# Patient Record
Sex: MALE | Race: WHITE | NOT HISPANIC OR LATINO | Employment: FULL TIME | URBAN - METROPOLITAN AREA
[De-identification: names, ages, dates, MRNs, and addresses within clinical notes are randomized per-mention and may not be internally consistent; named-entity substitution may affect disease eponyms.]

---

## 2017-07-17 ENCOUNTER — TELEPHONE (OUTPATIENT)
Dept: NEUROLOGY | Facility: CLINIC | Age: 27
End: 2017-07-17

## 2017-07-17 NOTE — TELEPHONE ENCOUNTER
----- Message from Smith Dukes sent at 7/14/2017  3:42 PM CDT -----  Contact: Pt  X_ 1st Request  _ 2nd Request  _ 3rd Request    Who:HOLLAND COLE [[id number]]    Why: Patient would like to speak with staff in regards to having a sooner appointment with Dr. Ritter. Patient stated he fell off a guerrier board during memorial day weekend and having constant headaches and nausea. Please advise    What Number to Call Back: [phone]    When to Expect a call back: (Before the end of the day)  -- if call after 3:00 call back will be tomorrow.

## 2017-08-09 ENCOUNTER — OFFICE VISIT (OUTPATIENT)
Dept: NEUROLOGY | Facility: CLINIC | Age: 27
End: 2017-08-09
Payer: COMMERCIAL

## 2017-08-09 VITALS
WEIGHT: 142.63 LBS | HEART RATE: 88 BPM | BODY MASS INDEX: 22.39 KG/M2 | HEIGHT: 67 IN | SYSTOLIC BLOOD PRESSURE: 128 MMHG | DIASTOLIC BLOOD PRESSURE: 88 MMHG

## 2017-08-09 DIAGNOSIS — F07.81 POST-CONCUSSION SYNDROME: ICD-10-CM

## 2017-08-09 DIAGNOSIS — S06.0X0A CONCUSSION WITHOUT LOSS OF CONSCIOUSNESS, INITIAL ENCOUNTER: ICD-10-CM

## 2017-08-09 PROCEDURE — 3008F BODY MASS INDEX DOCD: CPT | Mod: S$GLB,,, | Performed by: PSYCHIATRY & NEUROLOGY

## 2017-08-09 PROCEDURE — 99204 OFFICE O/P NEW MOD 45 MIN: CPT | Mod: S$GLB,,, | Performed by: PSYCHIATRY & NEUROLOGY

## 2017-08-09 PROCEDURE — 99999 PR PBB SHADOW E&M-EST. PATIENT-LVL III: CPT | Mod: PBBFAC,,, | Performed by: PSYCHIATRY & NEUROLOGY

## 2017-08-09 RX ORDER — TRAMADOL HYDROCHLORIDE 100 MG/1
100 CAPSULE ORAL
COMMUNITY
End: 2017-09-11

## 2017-08-09 RX ORDER — PANTOPRAZOLE SODIUM 40 MG/1
20 TABLET, DELAYED RELEASE ORAL DAILY
COMMUNITY
End: 2018-05-08 | Stop reason: SDUPTHER

## 2017-08-09 RX ORDER — PROMETHAZINE HYDROCHLORIDE 25 MG/1
25 TABLET ORAL EVERY 4 HOURS
COMMUNITY
End: 2018-03-01

## 2017-08-09 NOTE — ASSESSMENT & PLAN NOTE
Magnesium Oxide for headaches for now until follow up   Have discussed structure of RTP at a slow pace to build up endurance

## 2017-08-09 NOTE — PROGRESS NOTES
"Subjective:       Patient ID: Jae Nassar is a 27 y.o. male.    Chief Complaint:  Concussion      Consultation Requested by:   Aaareferral Self  No address on file    History of Present Illness  27-year-old male presents for evaluation of concussion sustained on 5/28/17.  He notes that he was with his family and fell off of a "hover board".  He did not hit his head however notes that 20 minutes after falling off of it he started having lightheadedness followed by subsequent headache and nausea.  He notes that at the beginning of June he would have a headache at least once a week which became worse with exertion.  He notes by the time late June was occurring he was feeling better and notes that he may have overdone it and played rugby increased video game use increases screen time he notes that on June 30 he had a migraine all day and on July 1 had a migraine all day with nausea.  He had daily headaches until 7/17 which were mild to moderate.  He saw a neurologist on the [facility] who saw him 3 times.  He was given a Medrol Dosepak and that ibuprofen and promethazine.  He was given an MRI which was normal.  Unfortunately it appears that the ibuprofen has caused him the beginnings of gastritis for which he will be evaluated for stomach ulcer in the next few weeks.  He notes that once he took ibuprofen for the 2 weeks his headache was completely gone but he did have stomach ache.  He started having nausea with eating food.  He notes his headaches came back on August 5.  He has had a decreased in his nausea notes that he still has headaches in the afternoon.  Since Saturday he notes that he only has a very mild daily headache which is a 1-2 out of 10 in intensity but has been occurring every single day.  He notes he is back to his usual self in terms of emotions sleep cycle and cognitive response although he has cut back on his physical activity significantly and has lost muscle mass noticeably.  He has filled " out a [facility] postconcussion symptom questionnaire and scored a 2, a mild problem for headaches, noise sensitivity, sleep disturbance, fatigue, feeling depressed, taking her to think, light sensitivity.  He scored a 1, no more of a problem for dizziness and nausea.  He is scored a 0, not experienced at all for being irritable, feeling frustrated, forgetfulness, poor concentration, blurred vision, double vision, restlessness.    Past Medical History:   Diagnosis Date    Headache        Past Surgical History:   Procedure Laterality Date    ORTHOPEDIC SURGERY Left 2010    labrum repair       History reviewed. No pertinent family history.    Social History     Social History    Marital status:      Spouse name: N/A    Number of children: N/A    Years of education: N/A     Social History Main Topics    Smoking status: Never Smoker    Smokeless tobacco: Never Used    Alcohol use None    Drug use: Unknown    Sexual activity: Not Asked     Other Topics Concern    None     Social History Narrative    None       Review of Systems  Review of Systems   Constitutional: Positive for activity change and fatigue.   Eyes: Positive for visual disturbance.   Musculoskeletal: Positive for neck pain and neck stiffness.   Neurological: Positive for dizziness and headaches.   Psychiatric/Behavioral: Positive for decreased concentration and sleep disturbance.       Objective:     Vitals:    08/09/17 1555   BP: 128/88   Pulse: 88      Physical Exam   Constitutional: He is oriented to person, place, and time. He appears well-developed and well-nourished. No distress.   HENT:   Head: Normocephalic and atraumatic.   Right Ear: Hearing normal.   Left Ear: Hearing normal.   Eyes: EOM are normal. Pupils are equal, round, and reactive to light. Right eye exhibits normal extraocular motion and no nystagmus. Left eye exhibits normal extraocular motion and no nystagmus.   Neck: Normal range of motion. Neck supple. No spinous  process tenderness and no muscular tenderness present. Carotid bruit is not present. No neck rigidity.   Musculoskeletal: Normal range of motion.   Neurological: He is alert and oriented to person, place, and time. He has normal strength and normal reflexes. He displays no atrophy, no tremor and normal reflexes. No cranial nerve deficit or sensory deficit. He exhibits normal muscle tone. He displays a negative Romberg sign. Gait normal. Coordination and gait normal. GCS eye subscore is 4. GCS verbal subscore is 5. GCS motor subscore is 6.   Reflex Scores:       Tricep reflexes are 2+ on the right side and 2+ on the left side.       Bicep reflexes are 2+ on the right side and 2+ on the left side.       Brachioradialis reflexes are 2+ on the right side and 2+ on the left side.       Patellar reflexes are 2+ on the right side and 2+ on the left side.       Achilles reflexes are 2+ on the right side and 2+ on the left side.  Fundoscopic exam shows no papilledema, no hemorrhage, no exudates bilaterally.    Cranial Nerves 2-12 are without focal deficit.      Psychiatric: He has a normal mood and affect. His speech is normal and behavior is normal. Thought content normal.       Neurologic Exam     Mental Status   Oriented to person, place, and time.   Attention: normal. Concentration: normal.   Speech: speech is normal   Level of consciousness: alert  Knowledge: good.   Normal comprehension.     Cranial Nerves   Cranial nerves II through XII intact.     CN II   Visual fields full to confrontation.     CN III, IV, VI   Pupils are equal, round, and reactive to light.  Extraocular motions are normal.     CN V   Facial sensation intact.     CN VII   Facial expression full, symmetric.     CN VIII   CN VIII normal.     CN IX, X   CN IX normal.   CN X normal.     CN XI   CN XI normal.     CN XII   CN XII normal.     Motor Exam   Muscle bulk: normal  Overall muscle tone: normal    Strength   Strength 5/5 throughout.     Sensory  Exam   Light touch normal.   Pinprick normal.     Gait, Coordination, and Reflexes     Gait  Gait: normal    Reflexes   Right brachioradialis: 2+  Left brachioradialis: 2+  Right biceps: 2+  Left biceps: 2+  Right triceps: 2+  Left triceps: 2+  Right patellar: 2+  Left patellar: 2+  Right achilles: 2+  Left achilles: 2+    I spent over 50% of a 45 minute visit in guidance, counseling and discussion of treatment options.  Assessment/Plan:     Problem List Items Addressed This Visit        Neuro    Concussion without loss of consciousness    Overview     From fall off of a hoverboard         Post-concussion syndrome    Overview     Lingering headaches mild dizziness  HA 1-2/10 now, but occur daily  Notes he feels 80% of normal  Wants to return to physical activity         Current Assessment & Plan     Magnesium Oxide for headaches for now until follow up   Have discussed structure of RTP at a slow pace to build up endurance           Other Visit Diagnoses    None.          27-year-old male presents for evaluation of concussion sustained on 5/28/17 without direct head impact.  At this time the patient has had a significant recovery although is not 100%.  For now we have discussed that due to the mild nature of his headaches though they're lingering I would consider giving him supplements for headache including magnesium oxide versus riboflavin.  We have discussed extensively a return to play program that may include a progressive four-week physical activity regimen with activity 2-3 times a week.  I will see him back in about 4-6 weeks' time to evaluate how he is doing.  At that time if he has not had much improvement than I would consider intervention with physical therapy and/or pharmaceutical agents.  We have discussed the fact that I do not believe that his previous 2 concussions are contributing to his current concussion although he may have had some prolonged symptoms due to a return to a level of activity that  was too high for what he could tolerate.  We have discussed that I do not believe that his head injuries will lead him to chronic traumatic encephalopathy.   The patient verbalizes understanding and agreement with the treatment plan. Questions were sought and answered to his stated verbal satisfaction.        Alley Dhaliwal MD    This note is dictated on Dragon Natural Speaking word recognition program. There are word recognition mistakes that are occasionally missed on review.

## 2017-08-10 ENCOUNTER — PATIENT MESSAGE (OUTPATIENT)
Dept: NEUROLOGY | Facility: CLINIC | Age: 27
End: 2017-08-10

## 2017-08-10 ENCOUNTER — TELEPHONE (OUTPATIENT)
Dept: NEUROLOGY | Facility: CLINIC | Age: 27
End: 2017-08-10

## 2017-08-10 ENCOUNTER — OFFICE VISIT (OUTPATIENT)
Dept: NEUROLOGY | Facility: CLINIC | Age: 27
End: 2017-08-10
Payer: COMMERCIAL

## 2017-08-10 VITALS
SYSTOLIC BLOOD PRESSURE: 122 MMHG | WEIGHT: 140 LBS | HEIGHT: 67 IN | DIASTOLIC BLOOD PRESSURE: 90 MMHG | HEART RATE: 88 BPM | BODY MASS INDEX: 21.97 KG/M2

## 2017-08-10 DIAGNOSIS — F07.81 POST-CONCUSSION SYNDROME: ICD-10-CM

## 2017-08-10 DIAGNOSIS — S13.4XXA WHIPLASH, INITIAL ENCOUNTER: Primary | ICD-10-CM

## 2017-08-10 DIAGNOSIS — S19.9XXA NECK INJURY, INITIAL ENCOUNTER: Primary | ICD-10-CM

## 2017-08-10 PROCEDURE — 99214 OFFICE O/P EST MOD 30 MIN: CPT | Mod: S$GLB,,, | Performed by: PSYCHIATRY & NEUROLOGY

## 2017-08-10 PROCEDURE — 99999 PR PBB SHADOW E&M-EST. PATIENT-LVL III: CPT | Mod: PBBFAC,,, | Performed by: PSYCHIATRY & NEUROLOGY

## 2017-08-10 PROCEDURE — 3008F BODY MASS INDEX DOCD: CPT | Mod: S$GLB,,, | Performed by: PSYCHIATRY & NEUROLOGY

## 2017-08-10 NOTE — PROGRESS NOTES
Subjective:       Patient ID: Jae Nassar is a 27 y.o. male.    Reason for Consult: Neck Pain      Interval History:  Jae Nassar is here for follow up. Their condition has changed.  Apparently his wife was trying to pick his nose last night and he abruptly jerked his head.  He notes that when he did this he became somewhat nauseous and very anxious about injuring his neck and repeating his concussion.  He overall does complain about anxiousness and is anxious on today's visit.  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     Objective:     Vitals:    08/10/17 1142   BP: (!) 122/90   Pulse: 88     There is tenderness to palpation left greater than right splenius capitis trapezius and cervical paraspinal musculature with positive muscle twitch response otherwise strength is antigravity in all 4 limbs of cranial nerves II through XII without focal deficit.  Focused examination was undertaken today. Over 50% of face to face time of 25 minute visit time was in giving guidance, counseling and discussing treatment options.    Assessment/Plan:     Problem List Items Addressed This Visit        Neuro    Post-concussion syndrome    Overview     Lingering headaches mild dizziness  HA 1-2/10 now, but occur daily  Notes he feels 80% of normal  Wants to return to physical activity  Now with neck stiffness  Question of stomach ulcer from 2 weeks of ibuprofen         Whiplash - Primary    Overview     Turned head away from wife when she was trying to pick his nose on 8/9/17  Notes that this worsened his anxiety and nausea  Notes he had a mild worsening of headache         Relevant Orders    Ambulatory Referral to Physical/Occupational Therapy      Other Visit Diagnoses    None.       27-year-old male presents for evaluation of an acute whiplash injury related to interaction with his wife last night.  This is all in the  setting of recovery after concussion that has been prolonged.  At this time we have discussed treatment options and I would recommend cervical spine rehabilitation.  He is having his upper endoscopy next week.  I do believe that some of his symptoms may actually be contributing to a possible ulcer or gastritis which he is having further workup for.  He has asked for medication for anxiety however I've explained to him that should try working on mindfulness and breathing techniques in the time being.  Consider referral to psychiatry if anxiety continues to be a lingering symptom.  I will follow up with them in 2 month(s).  The patient verbalizes understanding and agreement with the treatment plan. I have discussed risks, benefits and alternatives to the treatment plan. Questions were sought and answered to his stated verbal satisfaction.        Alley Dhaliwal MD    This note is dictated on Dragon Natural Speaking word recognition program. There are word recognition mistakes that are occasionally missed on review.

## 2017-08-10 NOTE — TELEPHONE ENCOUNTER
"----- Message from Sheila Cervantes sent at 8/10/2017  7:45 AM CDT -----  Contact: Patient himself  X 1st Request  _  2nd Request  _  3rd Request    Who: Jae Nassar (mrn# [id number])    Why:  Patient called and said, "he twisted his neck last night and couldn't sleep."  Also said, "he was dizzy and had up set stomach."  Please give a call back at your earliest convenience.   THANKS!    What Number to Call Back: [phone]    When to Expect a call back: (With in 24 hours)                      "

## 2017-08-10 NOTE — TELEPHONE ENCOUNTER
I have responded to the patient mychart message.     If he still has questions after having read my response, he is welcome to come back in for evaluation, though based on his message, I recommended monitoring of his clinical condition.

## 2017-08-11 ENCOUNTER — PATIENT MESSAGE (OUTPATIENT)
Dept: NEUROLOGY | Facility: CLINIC | Age: 27
End: 2017-08-11

## 2017-08-16 ENCOUNTER — CLINICAL SUPPORT (OUTPATIENT)
Dept: REHABILITATION | Facility: HOSPITAL | Age: 27
End: 2017-08-16
Attending: PSYCHIATRY & NEUROLOGY
Payer: COMMERCIAL

## 2017-08-16 DIAGNOSIS — M99.01 SEGMENTAL AND SOMATIC DYSFUNCTION OF CERVICAL REGION: ICD-10-CM

## 2017-08-16 DIAGNOSIS — S13.4XXD WHIPLASH INJURY, SUBSEQUENT ENCOUNTER: Primary | ICD-10-CM

## 2017-08-16 PROCEDURE — 97161 PT EVAL LOW COMPLEX 20 MIN: CPT | Mod: PN | Performed by: PHYSICAL THERAPIST

## 2017-08-16 NOTE — PROGRESS NOTES
Physical Therapy Initial Evaluation     Name: Jae Nassar  [facility] Number: [id number]    Diagnosis:   Encounter Diagnoses   Name Primary?    Whiplash injury, subsequent encounter Yes    Segmental and somatic dysfunction of cervical region      Physician: Clive Dhaliwal III, MD  Treatment Orders: PT Eval and Treat  Past Medical History:   Diagnosis Date    Headache      Current Outpatient Prescriptions   Medication Sig    pantoprazole (PROTONIX) 40 MG tablet Take 40 mg by mouth once daily.    promethazine (PHENERGAN) 25 MG tablet Take 25 mg by mouth every 4 (four) hours.    tramadol 100 mg MP25 Take 100 mg by mouth.     No current facility-administered medications for this visit.      Review of patient's allergies indicates:   Allergen Reactions    Pcn [penicillins]      Start Time:  1615  Stop Time:  1715  Total Timed Minutes:  60          Subjective       Patient states:  Headaches with bilateral tightness that feels at the base of the head and neck. The HA are intermittent. Says they are all over and no one location that he can describe.   Onset: late May when he fell off a hover board landing on his side. He says that he did not strike his head and did not loose consciousness. Developed some nausea and light headedness that evening. This resolved in a few hours. HA and light headedness ensued through the month of June. Began with migraines in late June / early July. Jerked his neck one week ago with return of nausea and light headedness. This resolved in 24 hours.  Presently he says he is improved. Just has HA that occur in the latter part of the day.   Radicular symptoms:  No   Aggravating factors:   Quick sudden movements that involve looking up or down and turning.   Easing factors:  Self limits.   Sleep - no disturbance, taking melatonin   Dizziness, blurred vision, tinnitus - negative   Headaches - yes  Nausea - no   Difficulty swallowing - no     Pain Scale: Patient rates pain on a  scale of 0-10 to be  0 cervical pain   currently   1-2 tightness at worst ;   0 at best .    Prior Therapy: no   Home Environment (Steps/Adaptations): hi location of TV in bedroom ( hi and to the right)  Functional Deficits Leading to Referral:   Personal - no limitations    Domestic - no limitations   Community / Social - no limitations   Occupation - no limitations   Prior functional status: no restrictionns    Occupation:  Teacher of special needs students.                     Pts goals:  Try to eliminate the headaches.   Past History: Patient thinks he may have had two concussions in high school while playing Rugby but did not see MD or was diagnosed. Left shoulder surgery, labrum repair seven years ago by Dr. Gallego. Denies other musculoskeletal conditions for which he was evaluated.   Imaging: MRI - brain.    Objective     Posture Alignment: lateral deviation / tilt  to the left   Palpation: pain at right sub-occipital and over right articular pillar at C1C2, no areas of spasm or guarding, mild tightness right side at the level of C2C3 articular pillar.   Sensation: Light Touch: Intact    Range of Motion/Strength:     Cervical/Thoracic AROM: Pain/Dysfunction with Movement:   Flexion                                      80 FN   Extension                                  70 DN - tight sub-occipital area - end range   Right side bending                     65 FN   Left side bending                        60 FN - tight on the right    Right rotation FN    Left rotation DP - tight at end range     SEGMENTAL MOBILITY: normal segmental mobility in the cervical spine;.     UPPER EXTREMITY STRENGTH:   Left 5/5 Right 5/5         DTR's:   Left Right   Biceps Tendon 2+ 2+   Brachioradialis 2+ 2+   Triceps Tendon 1+ 1+       Special Tests:   Left Right   Compression neg neg   Dystraction neg neg   Spurling neg neg       Selective Functional Movement Assessment:  FN: functional, non-painful  FP: functional, painful  DP:  dysfunctional, painful  DN: dysfunctional, non-painful    Active Cervical Flexion: see above   Active Cervical Extension: see above   Cervical Rotation:  Right:see above   Left: see abpove  Upper extremity pattern 1:  Right: FN  Left:FN  Upper extremity pattern 2:  Right:FN  Left:FN  Breakouts  OA active - DN to the left with restriction on the right .      PT Evaluation Completed? Yes  Discussed Plan of Care with patient: Yes      Pt/family was provided educational information, including: role of PT, goals for PT, scheduling - pt verbalized understanding.       Assessment       Initial Assessment (Pertinent finding, problem list and factors affecting outcome): The patient is referred with c/o headaches. Clinical findings demonstrate a joint mobility dysfunction with some tissue extensibility dysfunction in the right sub-occipital region and upper cervical spine. There are no neurological deficits identified.   Pt presents with signs and symptoms consistent with referring diagnosis. Evaluation has determined  subjective and objective deficits that can be addressed by physical therapy intervention. Pt demonstrates headaches limiting functional activities. Decreased flexibility  limiting normal movement patterns. Decreased segmental motion noted. Decreased postural strength and awareness.  Decreased participation in functional and recreational activities on occasion . Subjective and objective measures are addressed by goals in the plan of care. Patient/family are involved in the development of these goals. Jae Nassar should benefit from therapeutic intervention to treat the symptoms and achieve established goals.Realistic expectations and hopeful outcomes were discussed. The patient demonstrated and verbalized an understanding of the program and goals as well as expectations. Patient has no barriers to learning. Patient verbalizes understanding of her program and goals. No cultural, spiritual, or  educational needs identified.  Rehab Potiential: good     Medical necessity is demonstrated by the following IMPAIRMENTS/PROBLEMS:  headaches and diminished segmental function as well as decreased flexibility and tissue extensibility        History  Co-morbidities and personal factors that may impact the plan of care Examination  Body Structures and Functions, activity limitations and participation restrictions that may impact the plan of care Clinical Presentation   Decision Making/ Complexity Score   Co-morbidities:  none.              Personal Factors:   none Body Regions: neckl    Body Systems: musculoskeletal          Activity limitations: Quick sudden movements that involve looking up or down and turning.       Participation Restrictions:   Does not identify any at this time.         Stable and uncomplicated              Complexity:  Low               Short Term GOALS: 6 weeks. Pt agrees with goals set.  1. Decrease headaches and pain frequency and intensity 20-40%  2. Increase flexibility in the cervical and scapulothoracic musculature  3. Sleeping without interrupted when melatonin is stopped.   4. Patient demonstrates independence with HEP.   5. Patient demonstrates independence with Postural Awareness and correction   6. Restore full cervical mobility in left rotation cervical OA motion     Long Term GOALS: 12 weeks. Pt agrees with goals set.  1. Patient demonstrates increased scapulothoracic strength and endurance to improve tolerance to functional activities.   2. Patient demonstrates increased function without limitation from pain and headaches upon sudden movements of the neck   .     CMS Impairment/Limitation/Restriction for FOTO Neck Survey  Status Limitation G-Code CMS Severity Modifier  Intake 67% 33% Current Status CJ - At least 20 percent but less than 40 percent  Predicted 81% 19% Goal Status+ CI - At least 1 percent but less than 20 percent  D/C Status CJ **only report if this is a one time  visit    Plan       Certification Period: 8/16/2017 to 9/16/2017  Recommended Treatment Plan: 2 times per week for 12 weeks: Manual Therapy, Patient Education and Therapeutic Exercise  Other Recommendations:    Pt may be seen by PTA as part of the rehabilitation team.         Therapist: Faraz Rodriguez PT

## 2017-08-17 NOTE — PLAN OF CARE
Physical Therapy Initial Evaluation     Name: Jae Nassar  [facility] Number: [id number]    Diagnosis:   Encounter Diagnoses   Name Primary?    Whiplash injury, subsequent encounter Yes    Segmental and somatic dysfunction of cervical region      Physician: Clive Dhaliwal III, MD  Treatment Orders: PT Eval and Treat  Past Medical History:   Diagnosis Date    Headache      Current Outpatient Prescriptions   Medication Sig    pantoprazole (PROTONIX) 40 MG tablet Take 40 mg by mouth once daily.    promethazine (PHENERGAN) 25 MG tablet Take 25 mg by mouth every 4 (four) hours.    tramadol 100 mg MP25 Take 100 mg by mouth.     No current facility-administered medications for this visit.      Review of patient's allergies indicates:   Allergen Reactions    Pcn [penicillins]      Start Time:  1615  Stop Time:  1715  Total Timed Minutes:  60          Subjective       Patient states:  Headaches with bilateral tightness that feels at the base of the head and neck. The HA are intermittent. Says they are all over and no one location that he can describe.   Onset: late May when he fell off a hover board landing on his side. He says that he did not strike his head and did not loose consciousness. Developed some nausea and light headedness that evening. This resolved in a few hours. HA and light headedness ensued through the month of June. Began with migraines in late June / early July. Jerked his neck one week ago with return of nausea and light headedness. This resolved in 24 hours.  Presently he says he is improved. Just has HA that occur in the latter part of the day.   Radicular symptoms:  No   Aggravating factors:   Quick sudden movements that involve looking up or down and turning.   Easing factors:  Self limits.   Sleep - no disturbance, taking melatonin   Dizziness, blurred vision, tinnitus - negative   Headaches - yes  Nausea - no   Difficulty swallowing - no     Pain Scale: Patient rates pain on a scale of  0-10 to be  0 cervical pain   currently   1-2 tightness at worst ;   0 at best .    Prior Therapy: no   Home Environment (Steps/Adaptations): hi location of TV in bedroom ( hi and to the right)  Functional Deficits Leading to Referral:   Personal - no limitations    Domestic - no limitations   Community / Social - no limitations   Occupation - no limitations   Prior functional status: no restrictionns    Occupation:  Teacher of special needs students.                     Pts goals:  Try to eliminate the headaches.   Past History: Patient thinks he may have had two concussions in high school while playing Rugby but did not see MD or was diagnosed. Left shoulder surgery, labrum repair seven years ago by Dr. Gallego. Denies other musculoskeletal conditions for which he was evaluated.   Imaging: MRI - brain.    Objective     Posture Alignment: lateral deviation / tilt  to the left   Palpation: pain at right sub-occipital and over right articular pillar at C1C2, no areas of spasm or guarding, mild tightness right side at the level of C2C3 articular pillar.   Sensation: Light Touch: Intact    Range of Motion/Strength:     Cervical/Thoracic AROM: Pain/Dysfunction with Movement:   Flexion                                      80 FN   Extension                                  70 DN - tight sub-occipital area - end range   Right side bending                     65 FN   Left side bending                        60 FN - tight on the right    Right rotation FN    Left rotation DP - tight at end range     SEGMENTAL MOBILITY: normal segmental mobility in the cervical spine;.     UPPER EXTREMITY STRENGTH:   Left 5/5 Right 5/5         DTR's:   Left Right   Biceps Tendon 2+ 2+   Brachioradialis 2+ 2+   Triceps Tendon 1+ 1+       Special Tests:   Left Right   Compression neg neg   Dystraction neg neg   Spurling neg neg       Selective Functional Movement Assessment:  FN: functional, non-painful  FP: functional, painful  DP:  dysfunctional, painful  DN: dysfunctional, non-painful    Active Cervical Flexion: see above   Active Cervical Extension: see above   Cervical Rotation:  Right:see above   Left: see abpove  Upper extremity pattern 1:  Right: FN  Left:FN  Upper extremity pattern 2:  Right:FN  Left:FN  Breakouts  OA active - DN to the left with restriction on the right .      PT Evaluation Completed? Yes  Discussed Plan of Care with patient: Yes      Pt/family was provided educational information, including: role of PT, goals for PT, scheduling - pt verbalized understanding.       Assessment       Initial Assessment (Pertinent finding, problem list and factors affecting outcome): The patient is referred with c/o headaches. Clinical findings demonstrate a joint mobility dysfunction with some tissue extensibility dysfunction in the right sub-occipital region and upper cervical spine. There are no neurological deficits identified.   Pt presents with signs and symptoms consistent with referring diagnosis. Evaluation has determined  subjective and objective deficits that can be addressed by physical therapy intervention. Pt demonstrates headaches limiting functional activities. Decreased flexibility  limiting normal movement patterns. Decreased segmental motion noted. Decreased postural strength and awareness.  Decreased participation in functional and recreational activities on occasion . Subjective and objective measures are addressed by goals in the plan of care. Patient/family are involved in the development of these goals. Jae Nassar should benefit from therapeutic intervention to treat the symptoms and achieve established goals.Realistic expectations and hopeful outcomes were discussed. The patient demonstrated and verbalized an understanding of the program and goals as well as expectations. Patient has no barriers to learning. Patient verbalizes understanding of her program and goals. No cultural, spiritual, or  educational needs identified.  Rehab Potiential: good     Medical necessity is demonstrated by the following IMPAIRMENTS/PROBLEMS:  headaches and diminished segmental function as well as decreased flexibility and tissue extensibility        History  Co-morbidities and personal factors that may impact the plan of care Examination  Body Structures and Functions, activity limitations and participation restrictions that may impact the plan of care Clinical Presentation   Decision Making/ Complexity Score   Co-morbidities:  none.              Personal Factors:   none Body Regions: neckl    Body Systems: musculoskeletal          Activity limitations: Quick sudden movements that involve looking up or down and turning.       Participation Restrictions:   Does not identify any at this time.         Stable and uncomplicated              Complexity:  Low               Short Term GOALS: 6 weeks. Pt agrees with goals set.  1. Decrease headaches and pain frequency and intensity 20-40%  2. Increase flexibility in the cervical and scapulothoracic musculature  3. Sleeping without interrupted when melatonin is stopped.   4. Patient demonstrates independence with HEP.   5. Patient demonstrates independence with Postural Awareness and correction   6. Restore full cervical mobility in left rotation cervical OA motion     Long Term GOALS: 12 weeks. Pt agrees with goals set.  1. Patient demonstrates increased scapulothoracic strength and endurance to improve tolerance to functional activities.   2. Patient demonstrates increased function without limitation from pain and headaches upon sudden movements of the neck   .     CMS Impairment/Limitation/Restriction for FOTO Neck Survey  Status Limitation G-Code CMS Severity Modifier  Intake 67% 33% Current Status CJ - At least 20 percent but less than 40 percent  Predicted 81% 19% Goal Status+ CI - At least 1 percent but less than 20 percent  D/C Status CJ **only report if this is a one time  visit    Plan       Certification Period: 8/16/2017 to 9/16/2017  Recommended Treatment Plan: 2 times per week for 12 weeks: Manual Therapy, Patient Education and Therapeutic Exercise  Other Recommendations:    Pt may be seen by PTA as part of the rehabilitation team.         Therapist: Faraz Rodriguez PT

## 2017-08-21 ENCOUNTER — CLINICAL SUPPORT (OUTPATIENT)
Dept: REHABILITATION | Facility: HOSPITAL | Age: 27
End: 2017-08-21
Attending: PSYCHIATRY & NEUROLOGY
Payer: COMMERCIAL

## 2017-08-21 DIAGNOSIS — S13.4XXD WHIPLASH INJURY, SUBSEQUENT ENCOUNTER: Primary | ICD-10-CM

## 2017-08-21 DIAGNOSIS — M99.01 SEGMENTAL AND SOMATIC DYSFUNCTION OF CERVICAL REGION: ICD-10-CM

## 2017-08-21 PROCEDURE — 97110 THERAPEUTIC EXERCISES: CPT | Mod: PN | Performed by: PHYSICAL THERAPIST

## 2017-08-21 NOTE — PROGRESS NOTES
"                                            Physical Therapy Daily Note           Name: Jae Nassar  Clinic Number: [id number]  Date of Treatment: 08/21/2017   Diagnosis:   Encounter Diagnoses   Name Primary?    Whiplash injury, subsequent encounter Yes    Segmental and somatic dysfunction of cervical region        Time in: 1510  Time Out: 1600  Total Treatment Time: 50    VISITS / HOLDEN: 2/  BOLD=INDICATES ACTIVITIES PERFORMED.      Subjective  Patient states "he has not having HA today or yesterday. No neck pain the past two days. Sleeping fine w/o interruption. Using one pillow.   Pain level - 0/10    Objective    Treatment: Patient  was instructed in and performed therapeutic exercises to develop strength, ROM, flexibility and posture. Patient performed therapeutic exercises consisting of the following      Leg press   Recumbent bike  Upright bike   UE ergometer 6'   Treadmill   Elliptical       THERAPEUTIC EXERCISE  SUPINE - semi recumbent   -chin tucks   -active rotation    -OA nodding  -am raise w/rotation   -arm pit sniff stretch   -cerv tess   -thoracic stretch 1' x 3    SIDELYING    PRONE  -cerv rotation  x15      STANDING.    SITTING         MANUAL THERAPY  MODALITY  DIRECT EDUCATION:       Assessment    Did not demonstrate limitation in left rotation. There is tightness throughout the thoracic spine segments. Completed activities w/o pain or recurrence of HA. Progress with ST strengthening.   Medical Necessity is demonstrated by:   difficulty  to participate in daily activities  Pain limits function of effected part for some activities, Requires skilled supervision to complete and progress treatment interventions and HEP.  Pt demo good understanding of the education provided. Patient demonstrated good return demonstration of activities.Patient's tolerance to treatment was good. Patient will continue to benefit from skilled PTintervention.Patient is making progress towards established " goals.  New/Revised goals: no  Continue with established Plan of Care towards PT goals.       PLAN     Certification Period: 8/16/2017 to 9/16/2017  Recommended Treatment Plan: 2 times per week for 12 weeks: Manual Therapy, Patient Education and Therapeutic Exercise  Other Recommendations:    Pt may be seen by PTA as part of the rehabilitation team.         Therapist: Faraz Rodriguez PT

## 2017-08-23 ENCOUNTER — CLINICAL SUPPORT (OUTPATIENT)
Dept: REHABILITATION | Facility: HOSPITAL | Age: 27
End: 2017-08-23
Attending: PSYCHIATRY & NEUROLOGY
Payer: COMMERCIAL

## 2017-08-23 DIAGNOSIS — M99.01 SEGMENTAL AND SOMATIC DYSFUNCTION OF CERVICAL REGION: ICD-10-CM

## 2017-08-23 DIAGNOSIS — S13.4XXD WHIPLASH INJURY, SUBSEQUENT ENCOUNTER: Primary | ICD-10-CM

## 2017-08-23 PROCEDURE — 97110 THERAPEUTIC EXERCISES: CPT | Mod: PN | Performed by: PHYSICAL THERAPIST

## 2017-08-23 NOTE — PROGRESS NOTES
"                                            Physical Therapy Daily Note           Name: Jae Nassar  Clinic Number: [id number]  Date of Treatment: 08/23/2017   Diagnosis:   Encounter Diagnoses   Name Primary?    Whiplash injury, subsequent encounter Yes    Segmental and somatic dysfunction of cervical region        Time in: 1610  Time Out: 1705  Total Treatment Time: 55    VISITS / HOLDEN: 3/25 - 12/31/2017  BOLD=INDICATES ACTIVITIES PERFORMED.      Subjective  Patient states "he is not having any pain in the neck or shoulder areas   Pain level - 0/10    Objective    Treatment: Patient  was instructed in and performed therapeutic exercises to develop strength, ROM, flexibility and posture. Patient performed therapeutic exercises consisting of the following      Leg press   Recumbent bike  Upright bike   UE ergometer 6'   Treadmill   Elliptical       THERAPEUTIC EXERCISE  SUPINE - semi recumbent   -chin tucks   -active rotation    -OA nodding  -am raise w/rotation   -arm pit sniff stretch   -cerv tess   -thoracic stretch 1' x 3  -patterns TD, [place], [place] 10 patterns    SIDELYING    PRONE  -cerv rotation  x15  -Y,W,T,I  10 patterns     STANDING.    SITTING         MANUAL THERAPY  MODALITY  DIRECT EDUCATION:       Assessment  Completed all activities as noted w/o limitation from pain. He seems to be progressing well at this time.   Medical Necessity is demonstrated by:   difficulty  to participate in daily activities  Pain limits function of effected part for some activities, Requires skilled supervision to complete and progress treatment interventions and HEP.  Pt demo good understanding of the education provided. Patient demonstrated good return demonstration of activities.Patient's tolerance to treatment was good. Patient will continue to benefit from skilled PTintervention.Patient is making progress towards established goals.  New/Revised goals: no  Continue with established Plan of Care towards PT " goals.       PLAN     Certification Period: 8/16/2017 to 9/16/2017  Recommended Treatment Plan: 2 times per week for 12 weeks: Manual Therapy, Patient Education and Therapeutic Exercise  Other Recommendations:    Pt may be seen by PTA as part of the rehabilitation team.         Therapist: Faraz Rodriguez PT

## 2017-08-29 ENCOUNTER — CLINICAL SUPPORT (OUTPATIENT)
Dept: REHABILITATION | Facility: HOSPITAL | Age: 27
End: 2017-08-29
Attending: PSYCHIATRY & NEUROLOGY
Payer: COMMERCIAL

## 2017-08-29 DIAGNOSIS — M99.01 SEGMENTAL AND SOMATIC DYSFUNCTION OF CERVICAL REGION: ICD-10-CM

## 2017-08-29 DIAGNOSIS — S13.4XXD WHIPLASH INJURY, SUBSEQUENT ENCOUNTER: Primary | ICD-10-CM

## 2017-08-29 PROCEDURE — 97110 THERAPEUTIC EXERCISES: CPT | Mod: PN | Performed by: PHYSICAL THERAPIST

## 2017-08-29 NOTE — PROGRESS NOTES
"                                              Physical Therapy Daily Note           Name: Jae Nassar  Clinic Number: [id number]  Date of Treatment: 08/29/2017   Diagnosis:   Encounter Diagnoses   Name Primary?    Whiplash injury, subsequent encounter Yes    Segmental and somatic dysfunction of cervical region        Time in: 1505  Time Out: 1605  Total Treatment Time:     VISITS / HOLDEN: 4/25 - 12/31/2017  BOLD=INDICATES ACTIVITIES PERFORMED.      Subjective  Patient states "that his neck is feeling OK. Says that he was at a function and was turning his head quickly at which point he felt some tugging in the neck that did not last. Slowed down how fast I was turning my head.     Pain level - 0/10    Objective    Treatment: Patient  was instructed in and performed therapeutic exercises to develop strength, ROM, flexibility and posture. Patient performed therapeutic exercises consisting of the following      Leg press   Recumbent bike  Upright bike   UE ergometer 6'   Treadmill   Elliptical       THERAPEUTIC EXERCISE  SUPINE - semi recumbent   -chin tucks  x20   -active rotation  x12  -OA nodding x15   -am raise w/rotation x15  -arm pit sniff stretch 10" x 6   -cerv tess 6" x 10   -thoracic stretch 1' x 3  -patterns TD, [place], [place] 10 patterns 5 w/o cord  - 5 w/cord YTB    SIDELYING    PRONE  -cerv rotation  x15  -Y,W,T,I  10 patterns     STANDING.    SITTING         MANUAL THERAPY  MODALITY  DIRECT EDUCATION:    Patient was issued HEP   Written Home Exercises Reviewed.   Pt demo good understanding of the education provided. Patient demonstrated good return demonstration of activities           Assessment   Completed all activities as noted. He did not demonstrate signs of pain. He was not challenged with performing the activities. Good response to added cord with pattern exercises. Progressing favorably.   Medical Necessity is demonstrated by:   difficulty  to participate in daily activities  Pain " limits function of effected part for some activities, Requires skilled supervision to complete and progress treatment interventions and HEP.  Pt demo good understanding of the education provided. Patient demonstrated good return demonstration of activities.Patient's tolerance to treatment was good. Patient will continue to benefit from skilled PTintervention.Patient is making progress towards established goals.  New/Revised goals: no  Continue with established Plan of Care towards PT goals.       PLAN     Certification Period: 8/16/2017 to 9/16/2017  Recommended Treatment Plan: 2 times per week for 12 weeks: Manual Therapy, Patient Education and Therapeutic Exercise  Other Recommendations:    Pt may be seen by PTA as part of the rehabilitation team.         Therapist: Faraz Rodriguez, PT

## 2017-08-31 ENCOUNTER — CLINICAL SUPPORT (OUTPATIENT)
Dept: REHABILITATION | Facility: HOSPITAL | Age: 27
End: 2017-08-31
Attending: PSYCHIATRY & NEUROLOGY
Payer: COMMERCIAL

## 2017-08-31 DIAGNOSIS — M99.01 SEGMENTAL AND SOMATIC DYSFUNCTION OF CERVICAL REGION: ICD-10-CM

## 2017-08-31 DIAGNOSIS — S13.4XXD WHIPLASH INJURY, SUBSEQUENT ENCOUNTER: Primary | ICD-10-CM

## 2017-08-31 PROCEDURE — 97110 THERAPEUTIC EXERCISES: CPT | Mod: PN

## 2017-08-31 NOTE — PROGRESS NOTES
"                                              Physical Therapy Daily Note           Name: Jae Nassar  Clinic Number: [id number]  Date of Treatment: 08/31/2017   Diagnosis:   Encounter Diagnoses   Name Primary?    Whiplash injury, subsequent encounter Yes    Segmental and somatic dysfunction of cervical region        Time in: 1635  Time Out:   Total Treatment Time:     VISITS / HOLDEN: 5/25 - 12/31/2017  BOLD=INDICATES ACTIVITIES PERFORMED.      Subjective  Patient reports that he does not have any neck pain today, however he has the start of a headache which he feels is from his ulcer.   Pain level - 0/10    Objective    Treatment: Patient  was instructed in and performed therapeutic exercises to develop strength, ROM, flexibility and posture. Patient performed therapeutic exercises consisting of the following      Leg press   Recumbent bike  Upright bike   UE ergometer 10'   Treadmill   Elliptical       THERAPEUTIC EXERCISE  SUPINE - semi recumbent   -chin tucks  x20   -active rotation  x12  -OA nodding x15   -am raise w/rotation x15  -arm pit sniff stretch 10" x 6   -cerv tess 6" x 10   -thoracic stretch 1' x 3  -patterns TD, [place], [place] 10 patterns 5 w/o cord  - 5 w/cord YTB    SIDELYING    PRONE  -cerv rotation  x15  -Y,W,T,I  10 patterns     STANDING    SITTING         MANUAL THERAPY  MODALITY: MHP x 10'   DIRECT EDUCATION:    Patient was issued HEP   Written Home Exercises Reviewed.   Pt demo good understanding of the education provided. Patient demonstrated good return demonstration of activities           Assessment   Jae tolerated treatment session very well.  Pt able to perform all exercises without provocation of pain.  Plan to progress patient with deep flexor strengthening exercises as tolerated by the patient.   Medical Necessity is demonstrated by:   difficulty  to participate in daily activities  Pain limits function of effected part for some activities, Requires skilled supervision " to complete and progress treatment interventions and HEP.  Pt demo good understanding of the education provided. Patient demonstrated good return demonstration of activities.Patient's tolerance to treatment was good. Patient will continue to benefit from skilled PTintervention.Patient is making progress towards established goals.  New/Revised goals: no  Continue with established Plan of Care towards PT goals.       PLAN     Certification Period: 8/16/2017 to 9/16/2017  Recommended Treatment Plan: 2 times per week for 12 weeks: Manual Therapy, Patient Education and Therapeutic Exercise  Other Recommendations:    Pt may be seen by PTA as part of the rehabilitation team.         Therapist: Tonia Vazquez PTA

## 2017-09-05 ENCOUNTER — CLINICAL SUPPORT (OUTPATIENT)
Dept: REHABILITATION | Facility: HOSPITAL | Age: 27
End: 2017-09-05
Attending: PSYCHIATRY & NEUROLOGY
Payer: COMMERCIAL

## 2017-09-05 DIAGNOSIS — M99.01 SEGMENTAL AND SOMATIC DYSFUNCTION OF CERVICAL REGION: ICD-10-CM

## 2017-09-05 DIAGNOSIS — S13.4XXD WHIPLASH INJURY, SUBSEQUENT ENCOUNTER: Primary | ICD-10-CM

## 2017-09-05 PROCEDURE — 97110 THERAPEUTIC EXERCISES: CPT | Mod: PN | Performed by: PHYSICAL THERAPIST

## 2017-09-05 NOTE — PROGRESS NOTES
"                                              Physical Therapy Daily Note           Name: Jae Nassar  Clinic Number: [id number]  Date of Treatment: 09/05/2017   Diagnosis:   Encounter Diagnoses   Name Primary?    Whiplash injury, subsequent encounter Yes    Segmental and somatic dysfunction of cervical region        Time in: 1515  Time Out: 1615  Total Treatment Time: 60    VISITS / HOLDEN: 6/25 - 12/31/2017  BOLD=INDICATES ACTIVITIES PERFORMED.      Subjective  Patient states "that he is not having any pain in the neck and no HAs. Says he had HA over the weekend and his ulcer was acting up some over the weekend. HA may be associated with the ulcer.     Pain level - 0/10    Objective    Treatment: Patient  was instructed in and performed therapeutic exercises to develop strength, ROM, flexibility and posture. Patient performed therapeutic exercises consisting of the following      Leg press   Recumbent bike  Upright bike   UE ergometer 8'   Treadmill   Elliptical       THERAPEUTIC EXERCISE  SUPINE - semi recumbent   -chin tucks  x20   -active rotation  x12  -OA nodding x15   -am raise w/rotation x15  -arm pit sniff stretch 10" x 6   -cerv tess 6" x 10   -thoracic stretch 1' x 3  -patterns TD, [place], [place] 10 patterns 5 w/o cord  - 5 w/cord YTB    SIDELYING    PRONE  -cerv rotation  x15  -Y,W,T,I  10 patterns     STANDING.    SITTING    MANUAL THERAPY  MODALITY  DIRECT EDUCATION:    Patient was issued HEP   Written Home Exercises Reviewed.   Pt demo good understanding of the education provided. Patient demonstrated good return demonstration of activities       Assessment   Routine completed as noted.He is progressing well and not encountering any pain or HA at this time. Will progress to standing with arm pattern exercises.   Medical Necessity is demonstrated by:   difficulty  to participate in daily activities  Pain limits function of effected part for some activities, Requires skilled supervision to " complete and progress treatment interventions and HEP.  Pt demo good understanding of the education provided. Patient demonstrated good return demonstration of activities.Patient's tolerance to treatment was good. Patient will continue to benefit from skilled PTintervention.Patient is making progress towards established goals.  New/Revised goals: no  Continue with established Plan of Care towards PT goals.       PLAN     Certification Period: 8/16/2017 to 9/16/2017  Recommended Treatment Plan: 2 times per week for 12 weeks: Manual Therapy, Patient Education and Therapeutic Exercise  Other Recommendations:    Pt may be seen by PTA as part of the rehabilitation team.         Therapist: Faraz Rodriguez, PT

## 2017-09-07 ENCOUNTER — CLINICAL SUPPORT (OUTPATIENT)
Dept: REHABILITATION | Facility: HOSPITAL | Age: 27
End: 2017-09-07
Attending: PSYCHIATRY & NEUROLOGY
Payer: COMMERCIAL

## 2017-09-07 DIAGNOSIS — S13.4XXD WHIPLASH INJURY, SUBSEQUENT ENCOUNTER: Primary | ICD-10-CM

## 2017-09-07 DIAGNOSIS — M99.01 SEGMENTAL AND SOMATIC DYSFUNCTION OF CERVICAL REGION: ICD-10-CM

## 2017-09-07 PROCEDURE — 97110 THERAPEUTIC EXERCISES: CPT | Mod: PN

## 2017-09-07 NOTE — PROGRESS NOTES
"                                              Physical Therapy Daily Note           Name: Jae Nassar  Clinic Number: [id number]  Date of Treatment: 09/07/2017   Diagnosis:   Encounter Diagnoses   Name Primary?    Whiplash injury, subsequent encounter Yes    Segmental and somatic dysfunction of cervical region        Time in: 1705  Time Out: 1755  Total Treatment Time: 50'    VISITS / HOLDEN: 7/25 - 12/31/2017  BOLD=INDICATES ACTIVITIES PERFORMED.      Subjective  Patient reports that he has been doing good.  Pt states that he has very minimal dizziness this morning and no noticable headaches.     Pain level - 0/10    Objective    Treatment: Patient  was instructed in and performed therapeutic exercises to develop strength, ROM, flexibility and posture. Patient performed therapeutic exercises consisting of the following      Leg press   Recumbent bike  Upright bike   UE ergometer 10'   Treadmill   Elliptical       THERAPEUTIC EXERCISE: 50'   SUPINE - semi recumbent   -chin tucks  x20   -active rotation  x12  -OA nodding x15   -cerv tess 6" x 10   -thoracic stretch 1' x 3  -patterns TD, [place], [place] 10 patterns 5 w/o cord  - 5 w/cord RTB    SIDELYING    PRONE  -cerv rotation  x15  -Y,W,T,I  10 patterns     STANDING.  Chin tucks with O float 20 x 3'' hold   Cervical rotation with O float x 15   + Standing Y, W, T x 10 2# standing against 1/2 foam roll   -arm raise w/rotation x15  -arm pit sniff stretch 10" x 6     SITTING    MANUAL THERAPY  MODALITY  DIRECT EDUCATION:    Patient was issued HEP   Written Home Exercises Reviewed.   Pt demo good understanding of the education provided. Patient demonstrated good return demonstration of activities       Assessment   Patient tolerated treatment session well. Pt with good response to progression of standing strengthening exercises without provocation of cervical pain.  Patient progressing well towards discharge goals.   Medical Necessity is demonstrated by:   " difficulty  to participate in daily activities  Pain limits function of effected part for some activities, Requires skilled supervision to complete and progress treatment interventions and HEP.  Pt demo good understanding of the education provided. Patient demonstrated good return demonstration of activities.Patient's tolerance to treatment was good. Patient will continue to benefit from skilled PTintervention.Patient is making progress towards established goals.  New/Revised goals: no  Continue with established Plan of Care towards PT goals.       PLAN     Certification Period: 8/16/2017 to 9/16/2017  Recommended Treatment Plan: 2 times per week for 12 weeks: Manual Therapy, Patient Education and Therapeutic Exercise  Other Recommendations:    Pt may be seen by PTA as part of the rehabilitation team.         Therapist: Tonia Vazquez PTA

## 2017-09-11 ENCOUNTER — OFFICE VISIT (OUTPATIENT)
Dept: NEUROLOGY | Facility: CLINIC | Age: 27
End: 2017-09-11
Payer: COMMERCIAL

## 2017-09-11 VITALS
DIASTOLIC BLOOD PRESSURE: 74 MMHG | HEIGHT: 67 IN | HEART RATE: 64 BPM | BODY MASS INDEX: 22.18 KG/M2 | SYSTOLIC BLOOD PRESSURE: 102 MMHG | WEIGHT: 141.31 LBS

## 2017-09-11 DIAGNOSIS — F07.81 POST-CONCUSSION SYNDROME: ICD-10-CM

## 2017-09-11 PROCEDURE — 99214 OFFICE O/P EST MOD 30 MIN: CPT | Mod: S$GLB,,, | Performed by: PSYCHIATRY & NEUROLOGY

## 2017-09-11 PROCEDURE — 99999 PR PBB SHADOW E&M-EST. PATIENT-LVL III: CPT | Mod: PBBFAC,,, | Performed by: PSYCHIATRY & NEUROLOGY

## 2017-09-11 PROCEDURE — 3008F BODY MASS INDEX DOCD: CPT | Mod: S$GLB,,, | Performed by: PSYCHIATRY & NEUROLOGY

## 2017-09-11 NOTE — PROGRESS NOTES
Subjective:       Patient ID: Jae Nassar is a 27 y.o. male.    Reason for Consult: Concussion and Neck Pain      Interval History:  Jae Nassar is here for follow up. Their condition has improved significantly.  He notes that the physical therapy has been extremely helpful for him.  He does complain of some occasional dizziness however he notes he has a confirmed stomach ulcer.  He notes the dizziness came about after eating something that was a little bit spicy.  He notes that he also has some problems with feeling like is taking longer to think he notes he may also be a little bit hard on himself as he is able to do all of his duties on his job without any problem.  We have discussed the home exercise program and states he will try to become more vigilant and dutiful with that.     Objective:     Vitals:    09/11/17 1343   BP: 102/74   Pulse: 64     Patient is awake alert oriented to person place and time.  Cranial nerves II through XII without focal deficit.  Range of motion cervical spine is improved especially on lateral rotation.  Gait and station within normal limits.  Focused examination was undertaken today. Over 50% of face to face time of 25 minute visit time was in giving guidance, counseling and discussing treatment options.    Assessment/Plan:     Problem List Items Addressed This Visit        Neuro    Post-concussion syndrome    Overview     Lingering headaches mild dizziness  HA 1-2/10 now, but occur daily  Notes he feels 80% of normal  Wants to return to physical activity  Now with neck stiffness  Question of stomach ulcer from 2 weeks of ibuprofen         Current Assessment & Plan     Now has confirmed ulcer, but it is healing over time  No more headaches, no more neck pain  Consider MRI of the neck            Other Visit Diagnoses    None.       27-year-old male presents for evaluation follow-up of concussion after having sustained one approximately a month or so ago.  At this  point in time he feels as if he is back to baseline and notes that the physical therapy for his neck is helped significantly.  In 3 months time if he is compliant with a home exercise program and is completely asymptomatic that I will have considered him resolved in terms of his concussion.  If he notes that he is still having problems related to his neck that I would likely order an MRI of the cervical spine.  I will follow up with them in 3 month(s).  The patient verbalizes understanding and agreement with the treatment plan. I have discussed risks, benefits and alternatives to the treatment plan. Questions were sought and answered to his stated verbal satisfaction.        Alley Dhaliwal MD    This note is dictated on Dragon Natural Speaking word recognition program. There are word recognition mistakes that are occasionally missed on review.

## 2017-09-11 NOTE — ASSESSMENT & PLAN NOTE
Now has confirmed ulcer, but it is healing over time  No more headaches, no more neck pain  Consider MRI of the neck

## 2017-09-12 ENCOUNTER — CLINICAL SUPPORT (OUTPATIENT)
Dept: REHABILITATION | Facility: HOSPITAL | Age: 27
End: 2017-09-12
Attending: PSYCHIATRY & NEUROLOGY
Payer: COMMERCIAL

## 2017-09-12 DIAGNOSIS — S13.4XXD WHIPLASH INJURY, SUBSEQUENT ENCOUNTER: Primary | ICD-10-CM

## 2017-09-12 DIAGNOSIS — M99.01 SEGMENTAL AND SOMATIC DYSFUNCTION OF CERVICAL REGION: ICD-10-CM

## 2017-09-12 PROCEDURE — 97110 THERAPEUTIC EXERCISES: CPT | Mod: PN | Performed by: PHYSICAL THERAPIST

## 2017-09-12 NOTE — PROGRESS NOTES
"                                              Physical Therapy Daily Note           Name: Jae Nassar  Clinic Number: [id number]  Date of Treatment: 09/12/2017   Diagnosis:   Encounter Diagnoses   Name Primary?    Whiplash injury, subsequent encounter Yes    Segmental and somatic dysfunction of cervical region        Time in: 1700  Time Out: 1755  Total Treatment Time: 55'    VISITS / HOLDEN: 8/25 - 12/31/2017  BOLD=INDICATES ACTIVITIES PERFORMED.      Subjective  Patient reports that he has not had any neck pain or Ha since the last session. Does say he had an episode of lightheadedness but it went away. Says there has been improvement  Since the initial session.   Pain level - 0/10    Objective    Treatment: Patient  was instructed in and performed therapeutic exercises to develop strength, ROM, flexibility and posture. Patient performed therapeutic exercises consisting of the following      Leg press   Recumbent bike  Upright bike   UE ergometer 10'   Treadmill   Elliptical       THERAPEUTIC EXERCISE: 50'   SUPINE - semi recumbent   -chin tucks  x20   -active rotation  x12  -OA nodding x15   -cerv tess 6" x 10   -thoracic stretch 1' x 3  -patterns TD, [place], [place] 10 patterns 5 w/o cord  - 5 w/cord RTB    SIDELYING    PRONE  -cerv rotation  x15  -Y,W,T,I  10 patterns     STANDING.  Chin tucks with O float 20 x 3'' hold   Cervical rotation with O float x 15   + Standing Y, W, T x 10 2# standing against 1/2 foam roll   -arm raise w/rotation x15  -arm pit sniff stretch 10" x 6   -patterns TD, [place], [place] 10 patterns 5 w/o cord  - 5 w/cord RTB  SITTING    MANUAL THERAPY  MODALITY  DIRECT EDUCATION:    Patient was issued HEP   Written Home Exercises Reviewed.   Pt demo good understanding of the education provided. Patient demonstrated good return demonstration of activities       Assessment   Tolerance to standing arm activities with resistive cord was performed w/o limitation. There was no " provocation of pain associated with the exercises. Look to DC at 12 sessions.   Medical Necessity is demonstrated by:   difficulty  to participate in daily activities  Pain limits function of effected part for some activities, Requires skilled supervision to complete and progress treatment interventions and HEP.  Pt demo good understanding of the education provided. Patient demonstrated good return demonstration of activities.Patient's tolerance to treatment was good. Patient will continue to benefit from skilled PTintervention.Patient is making progress towards established goals.  New/Revised goals: no  Continue with established Plan of Care towards PT goals.       PLAN     Certification Period: 8/16/2017 to 9/16/2017  Recommended Treatment Plan: 2 times per week for 12 weeks: Manual Therapy, Patient Education and Therapeutic Exercise  Other Recommendations:    Pt may be seen by PTA as part of the rehabilitation team.         Therapist: Tonia aVzquez PTA

## 2017-09-14 ENCOUNTER — CLINICAL SUPPORT (OUTPATIENT)
Dept: REHABILITATION | Facility: HOSPITAL | Age: 27
End: 2017-09-14
Attending: PSYCHIATRY & NEUROLOGY
Payer: COMMERCIAL

## 2017-09-14 DIAGNOSIS — M99.01 SEGMENTAL AND SOMATIC DYSFUNCTION OF CERVICAL REGION: ICD-10-CM

## 2017-09-14 DIAGNOSIS — S13.4XXD WHIPLASH INJURY, SUBSEQUENT ENCOUNTER: Primary | ICD-10-CM

## 2017-09-14 PROCEDURE — 97110 THERAPEUTIC EXERCISES: CPT | Mod: PN

## 2017-09-14 NOTE — PROGRESS NOTES
"                                              Physical Therapy Daily Note           Name: Jae Nassar  Clinic Number: [id number]  Date of Treatment: 09/14/2017   Diagnosis:   Encounter Diagnoses   Name Primary?    Whiplash injury, subsequent encounter Yes    Segmental and somatic dysfunction of cervical region        Time in: 1634  Time Out: 1723  Total Treatment Time: 49'    VISITS / HOLDEN: 9/25 - 12/31/2017  BOLD=INDICATES ACTIVITIES PERFORMED.      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 Pt reports that he doesn't feel like his old self.   Pt states that he hasn't had a bad headache since he started therapy.  Pt states that he feels he has made 80% improvements since starting therapy.  Pt states he would like to get back to going to the gym.   Pain level - 0/10    Objective    Treatment: Patient  was instructed in and performed therapeutic exercises to develop strength, ROM, flexibility and posture. Patient performed therapeutic exercises consisting of the following      Leg press   Recumbent bike  Upright bike   UE ergometer 10' Standing   Treadmill   Elliptical       THERAPEUTIC EXERCISE: 49'   SUPINE - semi recumbent   -chin tucks  x20   -active rotation  x12  -OA nodding x15   -cerv tess 6" x 10   -thoracic stretch 1' x 3  -patterns TD, [place]anne Palacio 10 patterns  w/cord RTB    SIDELYING    PRONE  -cerv rotation  x15  -Y,W,T,I  10 patterns     STANDING  Chin tucks with O float 20 x 3'' hold   Cervical rotation with O float x 15   Standing Y, W, T x 10 2# standing against 1/2 foam roll   arm raise w/rotation x15  arm pit sniff stretch 10" x 6   patterns TD, [place]anne Palacio 10 patterns 5 w/o cord  - 5 w/cord RTB    SITTING :   Resisted chin tuck with YTB 1 x 10   Cervical Isometrics with YTB 1 x 10 ea    + Matrix Arm Curls 2 x 10 x 10#   + Matrix Triceps 2 x 10 x 50#  + Matrix Rows 2 x 10 x 50#  + Matrix Chest press 2 x 10 " x 40#     MANUAL THERAPY  MODALITY  DIRECT EDUCATION:    Patient was issued HEP   Written Home Exercises Reviewed.   Pt demo good understanding of the education provided. Patient demonstrated good return demonstration of activities       Assessment     Jae tolerated treatment session very well.  Pt with great tolerance to progression of cervical and scapular strengthening exercises.    Medical Necessity is demonstrated by:   difficulty  to participate in daily activities  Pain limits function of effected part for some activities, Requires skilled supervision to complete and progress treatment interventions and HEP.  Pt demo good understanding of the education provided. Patient demonstrated good return demonstration of activities.Patient's tolerance to treatment was good. Patient will continue to benefit from skilled PTintervention.Patient is making progress towards established goals.  New/Revised goals: no  Continue with established Plan of Care towards PT goals.       PLAN     Certification Period: 8/16/2017 to 9/16/2017  Recommended Treatment Plan: 2 times per week for 12 weeks: Manual Therapy, Patient Education and Therapeutic Exercise  Other Recommendations:    Pt may be seen by PTA as part of the rehabilitation team.         Therapist: Tonia Vazquez PTA

## 2017-09-19 ENCOUNTER — CLINICAL SUPPORT (OUTPATIENT)
Dept: REHABILITATION | Facility: HOSPITAL | Age: 27
End: 2017-09-19
Attending: PSYCHIATRY & NEUROLOGY
Payer: COMMERCIAL

## 2017-09-19 DIAGNOSIS — S13.4XXD WHIPLASH INJURY, SUBSEQUENT ENCOUNTER: Primary | ICD-10-CM

## 2017-09-19 DIAGNOSIS — M99.01 SEGMENTAL AND SOMATIC DYSFUNCTION OF CERVICAL REGION: ICD-10-CM

## 2017-09-19 PROCEDURE — 97110 THERAPEUTIC EXERCISES: CPT | Mod: PN | Performed by: PHYSICAL THERAPIST

## 2017-09-19 NOTE — PROGRESS NOTES
"                                              Physical Therapy Daily Note           Name: Jae Nassar  Clinic Number: [id number]  Date of Treatment: 09/19/2017   Diagnosis:   Encounter Diagnoses   Name Primary?    Whiplash injury, subsequent encounter Yes    Segmental and somatic dysfunction of cervical region        Time in: 1705  Time Out: 1800  Total Treatment Time: 55'    VISITS / HOLDEN: 10/25 - 12/31/2017  BOLD=INDICATES ACTIVITIES PERFORMED.      Subjective  Patient reports no pain at this time.   Pain level - 0/10    Objective    Treatment: Patient  was instructed in and performed therapeutic exercises to develop strength, ROM, flexibility and posture. Patient performed therapeutic exercises consisting of the following      Leg press   Recumbent bike  Upright bike   UE ergometer 10'   Treadmill   Elliptical       THERAPEUTIC EXERCISE: 50'   SUPINE - semi recumbent   -chin tucks  x20   -active rotation  x12  -OA nodding x15   -cerv tess 6" x 10   -thoracic stretch 1' x 3  -patterns TD, [place], [place] 10 patterns 5 w/o cord  - 5 w/cord RTB    SIDELYING    PRONE  -cerv rotation  x15  -Y,W,T,I  10 patterns     STANDING.  Chin tucks with O float 20 x 3'' hold   Cervical rotation with O float x 15   + Standing Y, W, T x 10 2# standing against 1/2 foam roll   -arm raise w/rotation x15  -arm pit sniff stretch 10" x 6   -patterns TD, [place], [place] 10 patterns 5 w/o cord  - 5 w/cord RTB  SITTING    MANUAL THERAPY  MODALITY  DIRECT EDUCATION:    Patient was issued HEP   Written Home Exercises Reviewed.   Pt demo good understanding of the education provided. Patient demonstrated good return demonstration of activities       Assessment   Patient is not having any difficulty with his exercise program at this time. He is progressing towards DC. May return to fitness routine.   Medical Necessity is demonstrated by:   difficulty  to participate in daily activities  Pain limits function of effected part for some " activities, Requires skilled supervision to complete and progress treatment interventions and HEP.  Pt demo good understanding of the education provided. Patient demonstrated good return demonstration of activities.Patient's tolerance to treatment was good. Patient will continue to benefit from skilled PTintervention.Patient is making progress towards established goals.  New/Revised goals: no  Continue with established Plan of Care towards PT goals.       PLAN     Certification Period: 8/16/2017 to 9/16/2017  Recommended Treatment Plan: 2 times per week for 12 weeks: Manual Therapy, Patient Education and Therapeutic Exercise  Other Recommendations:    Pt may be seen by PTA as part of the rehabilitation team.       Therapist: Faraz Rodriguez PT

## 2017-09-21 ENCOUNTER — CLINICAL SUPPORT (OUTPATIENT)
Dept: REHABILITATION | Facility: HOSPITAL | Age: 27
End: 2017-09-21
Attending: PSYCHIATRY & NEUROLOGY
Payer: COMMERCIAL

## 2017-09-21 DIAGNOSIS — S13.4XXD WHIPLASH INJURY, SUBSEQUENT ENCOUNTER: Primary | ICD-10-CM

## 2017-09-21 DIAGNOSIS — M99.01 SEGMENTAL AND SOMATIC DYSFUNCTION OF CERVICAL REGION: ICD-10-CM

## 2017-09-21 PROCEDURE — 97110 THERAPEUTIC EXERCISES: CPT | Mod: PN

## 2017-09-21 NOTE — PROGRESS NOTES
"                                              Physical Therapy Daily Note           Name: Jae Nassar  Clinic Number: [id number]  Date of Treatment: 09/21/2017   Diagnosis:       Time in: 1658  Time Out: 1738  Total Treatment Time: 41'     VISITS / HOLDEN: 11/25 - 12/31/2017  BOLD=INDICATES ACTIVITIES PERFORMED.      Subjective  Patient reports that he continues to do well.  Pt denies any pain or headaches.   Pain level - 0/10    Objective    Treatment: Patient  was instructed in and performed therapeutic exercises to develop strength, ROM, flexibility and posture. Patient performed therapeutic exercises consisting of the following      Leg press   Recumbent bike  Upright bike   UE ergometer 10'   Treadmill   Elliptical       THERAPEUTIC EXERCISE: 50'   SUPINE - semi recumbent   -chin tucks  x20   -active rotation  x12  -OA nodding x15   -cerv tess 6" x 10   -thoracic stretch 1' x 3  -patterns TD, [place], [place] 10 patterns 5 w/o cord  - 5 w/cord RTB    SIDELYING    PRONE  -cerv rotation  x15  -Y,W,T,I  10 patterns     STANDING  Chin tucks with O float 20 x 3'' hold   Cervical rotation with O float x 15   Standing Y, W, T x 10 3# standing against 1/2 foam roll   -arm raise w/rotation x15  -arm pit sniff stretch 10" x 6   -patterns TD, [place], [place] 10 patterns 5 w/o cord  - 5 w/cord GTB      SITTING    Matrix Arm Curls 2 x 10 x 40#   Matrix Triceps 2 x 10 x 50#  Matrix Rows 2 x 10 x 55# Both    Matrix Chest press 2 x 10 x 55#         MANUAL THERAPY  MODALITY  DIRECT EDUCATION:    Patient was issued HEP   Written Home Exercises Reviewed.   Pt demo good understanding of the education provided. Patient demonstrated good return demonstration of activities       Assessment   Patient tolerated treatment session well.  Patient progressing well towards discharge goals.      Medical Necessity is demonstrated by:   difficulty  to participate in daily activities  Pain limits function of effected part for some " activities, Requires skilled supervision to complete and progress treatment interventions and HEP.  Pt demo good understanding of the education provided. Patient demonstrated good return demonstration of activities.Patient's tolerance to treatment was good. Patient will continue to benefit from skilled PTintervention.Patient is making progress towards established goals.  New/Revised goals: no  Continue with established Plan of Care towards PT goals.       PLAN     Certification Period: 8/16/2017 to 9/16/2017  Recommended Treatment Plan: 2 times per week for 12 weeks: Manual Therapy, Patient Education and Therapeutic Exercise  Other Recommendations:    Pt may be seen by PTA as part of the rehabilitation team.       Therapist: Tonia Vazquez PTA

## 2017-09-26 ENCOUNTER — CLINICAL SUPPORT (OUTPATIENT)
Dept: REHABILITATION | Facility: HOSPITAL | Age: 27
End: 2017-09-26
Attending: PSYCHIATRY & NEUROLOGY
Payer: COMMERCIAL

## 2017-09-26 DIAGNOSIS — M99.01 SEGMENTAL AND SOMATIC DYSFUNCTION OF CERVICAL REGION: Primary | ICD-10-CM

## 2017-09-26 DIAGNOSIS — S13.4XXD WHIPLASH INJURY, SUBSEQUENT ENCOUNTER: ICD-10-CM

## 2017-09-26 PROCEDURE — 97110 THERAPEUTIC EXERCISES: CPT | Mod: PN | Performed by: PHYSICAL THERAPIST

## 2017-09-26 NOTE — PROGRESS NOTES
"                                              Physical Therapy Daily Note           Name: aJe Nassar  Clinic Number: [id number]  Date of Treatment: 09/26/2017   Diagnosis:   Encounter Diagnoses   Name Primary?    Segmental and somatic dysfunction of cervical region Yes    Whiplash injury, subsequent encounter      DISCHARGE NOTE  Physician:  Clive Dhaliwal III, MD  Original Orders : PT eval and treat  Initial visit: 8/16/2017  Date of Last visit: 9/27/2017  Date of Discharge Note:  9/27/2017  Total Visits Received: 12    Time in: 1650  Time Out:1750    Total Treatment Time: 60    VISITS / HOLDEN: 12/25 - 12/31/2017  BOLD=INDICATES ACTIVITIES PERFORMED.      Subjective  Patient reports no pain at this time. There is a slight HA in the front of the head. After session last Thursday he says he started with a HA   Pain level - 0/10    Objective    Selective Functional Movement Assessment:  FN: functional, non-painful  FP: functional, painful  DP: dysfunctional, painful  DN: dysfunctional, non-painful    Active Cervical Flexion: FN 70  Active Cervical Extension: FN 80   Cervical Rotation:    Right:FN 80   Left: FN 80  Supine   Flex   -Active - FN  -Passive - FN  Rotation   -Active - FN   -Passive - FN  OA - 20 degrees  -Active R- FN, L- FN  -Passive R- FN  L - FN  C1C2  -Active-   R/L - FN   -Passive  FN   Extension   Active- FN   Passive- FN     Treatment: Patient  was instructed in and performed therapeutic exercises to develop strength, ROM, flexibility and posture. Patient performed therapeutic exercises consisting of the following      Leg press   Recumbent bike  Upright bike   UE ergometer 10'   Treadmill   Elliptical       THERAPEUTIC EXERCISE: 50'   SUPINE - semi recumbent   -chin tucks  x20   -active rotation  x12  -OA nodding x15   -cerv tess 6" x 10   -thoracic stretch 1' x 3  -patterns TD, [place], [place] 10 patterns 5 w/o cord  - 5 w/cord RTB    SIDELYING    PRONE  -cerv rotation  x15  -Y,W,T,I  10 " "patterns     STANDING.  Chin tucks with O float 20 x 3'' hold   Cervical rotation with O float x 15   - Standing Y, W, T x 10 2# standing against 1/2 foam roll   -arm raise w/rotation x15  -arm pit sniff stretch 10" x 6   -patterns TD, [place], [place] 10 patterns 5 w/o cord  - 5 w/cord RTB    SITTING    MANUAL THERAPY  MODALITY  DIRECT EDUCATION:    Patient was issued HEP   Written Home Exercises Reviewed.   Pt demo good understanding of the education provided. Patient demonstrated good return demonstration of activities       Assessment   Completed all activities as noted. He did not demonstrate any limitations. Reports sensations of pulling in the right suprascapular region. He has progressed well. Reported symptoms have no correlation with exercises. Movements of the cervical spine are functional and w/o pain. Should be able manage his symptoms w/o difficulty. He is DC to HEP  Discharge reason : Met goals and Patient has maximized benefit from PT at this time  Goals Achieved: yes  CMS Impairment/Limitation/Restriction for FOTO Neck Survey  Status Limitation G-Code CMS Severity Modifier  Intake 67% 33%  Predicted 81% 19% Goal Status+ CI - At least 1 percent but less than 20 percent  9/5/2017 72% 28%  9/26/2017 79% 21% Current Status CJ - At least 20 percent but less than 40 percent  D/C Status CJ **only report if this is discharge survey    PLAN     Certification Period: 8/16/2017 to 9/16/2017  Recommended Treatment Plan: 2 times per week for 12 weeks: Manual Therapy, Patient Education and Therapeutic Exercise  Other Recommendations:    Pt may be seen by PTA as part of the rehabilitation team.     Discharge reason : Met goals,    Discharge plan :Continue HEP,    Therapist: Faraz Rodriguez PT  "

## 2017-11-10 ENCOUNTER — PATIENT MESSAGE (OUTPATIENT)
Dept: NEUROLOGY | Facility: CLINIC | Age: 27
End: 2017-11-10

## 2017-11-17 ENCOUNTER — TELEPHONE (OUTPATIENT)
Dept: NEUROLOGY | Facility: CLINIC | Age: 27
End: 2017-11-17

## 2017-11-17 NOTE — TELEPHONE ENCOUNTER
Dr. Dhaliwal will not be in clinic on the day of patient's next appointment. Unable to reach by phone or leave message.    Letter mailed and message sent through patient portal asking him to reschedule.    Appointment cancelled.

## 2017-12-18 ENCOUNTER — OFFICE VISIT (OUTPATIENT)
Dept: NEUROLOGY | Facility: CLINIC | Age: 27
End: 2017-12-18
Payer: COMMERCIAL

## 2017-12-18 VITALS
WEIGHT: 141.31 LBS | BODY MASS INDEX: 22.18 KG/M2 | DIASTOLIC BLOOD PRESSURE: 76 MMHG | SYSTOLIC BLOOD PRESSURE: 108 MMHG | HEART RATE: 88 BPM | HEIGHT: 67 IN

## 2017-12-18 DIAGNOSIS — F07.81 POST-CONCUSSION SYNDROME: ICD-10-CM

## 2017-12-18 PROBLEM — S06.0X0A CONCUSSION WITHOUT LOSS OF CONSCIOUSNESS: Status: RESOLVED | Noted: 2017-08-09 | Resolved: 2017-12-18

## 2017-12-18 PROBLEM — M99.01 SEGMENTAL AND SOMATIC DYSFUNCTION OF CERVICAL REGION: Status: RESOLVED | Noted: 2017-08-16 | Resolved: 2017-12-18

## 2017-12-18 PROBLEM — S13.4XXA WHIPLASH INJURY: Status: RESOLVED | Noted: 2017-08-10 | Resolved: 2017-12-18

## 2017-12-18 PROCEDURE — 99999 PR PBB SHADOW E&M-EST. PATIENT-LVL III: CPT | Mod: PBBFAC,,, | Performed by: PSYCHIATRY & NEUROLOGY

## 2017-12-18 PROCEDURE — 99214 OFFICE O/P EST MOD 30 MIN: CPT | Mod: S$GLB,,, | Performed by: PSYCHIATRY & NEUROLOGY

## 2017-12-18 NOTE — PROGRESS NOTES
Subjective:       Patient ID: Jae Nassar is a 27 y.o. male.    Reason for Consult: Concussion and Dizziness      Interval History:  Jae Nassar is here for follow up. Their condition has improved.  He notes that the only dizziness that he is dealing with is his intermittent dizziness that has been occurring off and on since 2012.  He feels as if he is 100% back to baseline.  He has recently had his daughter born and notes that she is about 2-1/2 months.  He notes that he feels as if he is able to adequately take care of her.  He notes that his neck occasionally bothersome but he notes that the stretches for physical therapy were quite helpful for him.     Objective:     Vitals:    12/18/17 1026   BP: 108/76   Pulse: 88     Patient is awake alert oriented person place and time.  Moves all 4 extremities against gravity.  Gait and station within normal limits.  Cranial nerves II through XII without focal deficit.  Focused examination was undertaken today. Over 50% of face to face time of 25 minute visit time was in giving guidance, counseling and discussing treatment options.    Assessment/Plan:     Problem List Items Addressed This Visit        Neuro    RESOLVED: Post-concussion syndrome    Overview     mild dizziness, but back at baseline, has been occurring since 2012                 27-year-old male presents for evaluation and follow-up of concussion.  At this point, think his concussion has completely dissipated.  I believe that he is back to his baseline even his complaints of dizziness which he admits is his baseline issue intermittently.  I have told him that he has any further problems with his neck that I would consider updating MRI for cervical spine versus physical therapy.  I will see him back on an as-needed basis.    The patient verbalizes understanding and agreement with the treatment plan. I have discussed risks, benefits and alternatives to the treatment plan. Questions were  sought and answered to his stated verbal satisfaction.        Alley Dhaliwal MD    This note is dictated on Dragon Natural Speaking word recognition program. There are word recognition mistakes that are occasionally missed on review.

## 2018-02-07 ENCOUNTER — PATIENT MESSAGE (OUTPATIENT)
Dept: NEUROLOGY | Facility: CLINIC | Age: 28
End: 2018-02-07

## 2018-02-10 ENCOUNTER — PATIENT MESSAGE (OUTPATIENT)
Dept: NEUROLOGY | Facility: CLINIC | Age: 28
End: 2018-02-10

## 2018-02-14 ENCOUNTER — PATIENT MESSAGE (OUTPATIENT)
Dept: NEUROLOGY | Facility: CLINIC | Age: 28
End: 2018-02-14

## 2018-02-15 ENCOUNTER — LAB VISIT (OUTPATIENT)
Dept: LAB | Facility: HOSPITAL | Age: 28
End: 2018-02-15
Attending: INTERNAL MEDICINE
Payer: COMMERCIAL

## 2018-02-15 ENCOUNTER — HOSPITAL ENCOUNTER (EMERGENCY)
Facility: HOSPITAL | Age: 28
Discharge: HOME | End: 2018-02-15
Attending: EMERGENCY MEDICINE
Payer: COMMERCIAL

## 2018-02-15 VITALS
DIASTOLIC BLOOD PRESSURE: 76 MMHG | RESPIRATION RATE: 18 BRPM | OXYGEN SATURATION: 99 % | HEART RATE: 95 BPM | HEIGHT: 67 IN | TEMPERATURE: 99 F | WEIGHT: 138 LBS | SYSTOLIC BLOOD PRESSURE: 133 MMHG | BODY MASS INDEX: 21.66 KG/M2

## 2018-02-15 DIAGNOSIS — R11.0 NAUSEA: ICD-10-CM

## 2018-02-15 DIAGNOSIS — R07.89 CHEST PAIN, NON-CARDIAC: ICD-10-CM

## 2018-02-15 DIAGNOSIS — F41.9 ANXIETY: ICD-10-CM

## 2018-02-15 DIAGNOSIS — K31.89 GASTROPTOSIS: Primary | ICD-10-CM

## 2018-02-15 DIAGNOSIS — R07.9 CHEST PAIN: Primary | ICD-10-CM

## 2018-02-15 LAB
ALBUMIN SERPL BCP-MCNC: 4.7 G/DL
ALBUMIN SERPL BCP-MCNC: 5.1 G/DL
ALP SERPL-CCNC: 72 U/L
ALP SERPL-CCNC: 80 U/L
ALT SERPL W/O P-5'-P-CCNC: 28 U/L
ALT SERPL W/O P-5'-P-CCNC: 31 U/L
AMPHET+METHAMPHET UR QL: NEGATIVE
AMYLASE SERPL-CCNC: 80 U/L
ANION GAP SERPL CALC-SCNC: 8 MMOL/L
AST SERPL-CCNC: 18 U/L
AST SERPL-CCNC: 20 U/L
BARBITURATES UR QL SCN>200 NG/ML: NEGATIVE
BASOPHILS # BLD AUTO: 0.03 K/UL
BASOPHILS NFR BLD: 0.3 %
BENZODIAZ UR QL SCN>200 NG/ML: NEGATIVE
BILIRUB DIRECT SERPL-MCNC: 0.4 MG/DL
BILIRUB SERPL-MCNC: 0.8 MG/DL
BILIRUB SERPL-MCNC: 1.1 MG/DL
BUN SERPL-MCNC: 13 MG/DL
BZE UR QL SCN: NEGATIVE
CALCIUM SERPL-MCNC: 10.2 MG/DL
CANNABINOIDS UR QL SCN: NEGATIVE
CHLORIDE SERPL-SCNC: 102 MMOL/L
CO2 SERPL-SCNC: 30 MMOL/L
CREAT SERPL-MCNC: 1.2 MG/DL
CREAT UR-MCNC: 35.2 MG/DL
DIFFERENTIAL METHOD: ABNORMAL
EOSINOPHIL # BLD AUTO: 0.3 K/UL
EOSINOPHIL NFR BLD: 2.6 %
ERYTHROCYTE [DISTWIDTH] IN BLOOD BY AUTOMATED COUNT: 11.9 %
EST. GFR  (AFRICAN AMERICAN): >60 ML/MIN/1.73 M^2
EST. GFR  (NON AFRICAN AMERICAN): >60 ML/MIN/1.73 M^2
GLUCOSE SERPL-MCNC: 96 MG/DL
HCT VFR BLD AUTO: 45.3 %
HGB BLD-MCNC: 17.2 G/DL
LIPASE SERPL-CCNC: 27 U/L
LYMPHOCYTES # BLD AUTO: 1.7 K/UL
LYMPHOCYTES NFR BLD: 17.2 %
MCH RBC QN AUTO: 30.8 PG
MCHC RBC AUTO-ENTMCNC: 38 G/DL
MCV RBC AUTO: 81 FL
METHADONE UR QL SCN>300 NG/ML: NEGATIVE
MONOCYTES # BLD AUTO: 0.6 K/UL
MONOCYTES NFR BLD: 5.8 %
NEUTROPHILS # BLD AUTO: 7.3 K/UL
NEUTROPHILS NFR BLD: 74.4 %
OPIATES UR QL SCN: NEGATIVE
PCP UR QL SCN>25 NG/ML: NEGATIVE
PLATELET # BLD AUTO: 189 K/UL
PMV BLD AUTO: 11.4 FL
POTASSIUM SERPL-SCNC: 4.2 MMOL/L
PROT SERPL-MCNC: 7.5 G/DL
PROT SERPL-MCNC: 8.1 G/DL
RBC # BLD AUTO: 5.58 M/UL
SODIUM SERPL-SCNC: 140 MMOL/L
TOXICOLOGY INFORMATION: NORMAL
TSH SERPL DL<=0.005 MIU/L-ACNC: 1.04 UIU/ML
WBC # BLD AUTO: 9.78 K/UL

## 2018-02-15 PROCEDURE — 96361 HYDRATE IV INFUSION ADD-ON: CPT

## 2018-02-15 PROCEDURE — 25000003 PHARM REV CODE 250: Performed by: PHYSICIAN ASSISTANT

## 2018-02-15 PROCEDURE — 84443 ASSAY THYROID STIM HORMONE: CPT

## 2018-02-15 PROCEDURE — 80053 COMPREHEN METABOLIC PANEL: CPT

## 2018-02-15 PROCEDURE — 80307 DRUG TEST PRSMV CHEM ANLYZR: CPT

## 2018-02-15 PROCEDURE — 93005 ELECTROCARDIOGRAM TRACING: CPT

## 2018-02-15 PROCEDURE — 85025 COMPLETE CBC W/AUTO DIFF WBC: CPT

## 2018-02-15 PROCEDURE — 83690 ASSAY OF LIPASE: CPT

## 2018-02-15 PROCEDURE — 80076 HEPATIC FUNCTION PANEL: CPT

## 2018-02-15 PROCEDURE — 96360 HYDRATION IV INFUSION INIT: CPT

## 2018-02-15 PROCEDURE — 36415 COLL VENOUS BLD VENIPUNCTURE: CPT

## 2018-02-15 PROCEDURE — 82150 ASSAY OF AMYLASE: CPT

## 2018-02-15 PROCEDURE — 99284 EMERGENCY DEPT VISIT MOD MDM: CPT | Mod: 25

## 2018-02-15 PROCEDURE — 93010 ELECTROCARDIOGRAM REPORT: CPT | Mod: ,,, | Performed by: INTERNAL MEDICINE

## 2018-02-15 RX ORDER — LANOLIN ALCOHOL/MO/W.PET/CERES
400 CREAM (GRAM) TOPICAL DAILY
COMMUNITY
End: 2021-03-11

## 2018-02-15 RX ORDER — SODIUM CHLORIDE 9 MG/ML
1000 INJECTION, SOLUTION INTRAVENOUS
Status: COMPLETED | OUTPATIENT
Start: 2018-02-15 | End: 2018-02-15

## 2018-02-15 RX ADMIN — SODIUM CHLORIDE 1000 ML: 0.9 INJECTION, SOLUTION INTRAVENOUS at 05:02

## 2018-02-15 NOTE — ED TRIAGE NOTES
"Pt presents to ED c/o L side chest discomfort for past 2 weeks. "Dull, weird feeling in my chest, sometimes it's a tightening." Currently states pain "it's small, it's just noticeable." Pt also reports lightheadedness and lower back pain.   "

## 2018-02-15 NOTE — ED PROVIDER NOTES
Encounter Date: 2/15/2018    SCRIBE #1 NOTE: I, Arline Woods, am scribing for, and in the presence of,  Jessica Martinez PA-C. I have scribed the following portions of the note - Other sections scribed: HPI/ROS .       History     Chief Complaint   Patient presents with    Chest Pain     Left sided chest discomfort x 2 weeks.  Intermittent, dull and squeezing.       CC: Chest Pain    HPI: 27 y.o. M with a PMHx of gastric ulcer and neck injury presents to the ED c/o intermittent, mild, dull L-sided CP for the past 2 weeks. Pt reports he is an anxious person and is a little stressed about his symptoms. Pt states he tends to think of the worst case scenario and is concerned he might have lung cancer. Pt reports of intermittent diarrhea but notes that this has been a problem since he was dxed with a gastric ulcer a few weeks ago; last episode was yesterday. Pt has not had any caffeine, alcohol, or marijuana since his diagnosis. Pt was on a course of pantoprazole but finished it 6 days ago. Pt had an endoscopy 7 days ago and states that it was normal. Pt worked out for one day about 3 weeks ago. Pt does not usually exercise. Pt denies fever, chills, night sweats, emesis, SOB, cough, palpitations, and any other symptoms.        The history is provided by the patient.     Review of patient's allergies indicates:   Allergen Reactions    Pcn [penicillins]      Past Medical History:   Diagnosis Date    Headache      Past Surgical History:   Procedure Laterality Date    ORTHOPEDIC SURGERY Left 2010    labrum repair     History reviewed. No pertinent family history.  Social History   Substance Use Topics    Smoking status: Never Smoker    Smokeless tobacco: Never Used    Alcohol use No     Review of Systems   Constitutional: Negative for chills, diaphoresis, fatigue and fever.   HENT: Negative for congestion, ear pain and sore throat.    Eyes: Negative for pain.   Respiratory: Negative for cough and shortness of breath.     Cardiovascular: Positive for chest pain. Negative for palpitations.   Gastrointestinal: Positive for diarrhea. Negative for abdominal pain, blood in stool, nausea and vomiting.   Genitourinary: Negative for dysuria, frequency and hematuria.   Musculoskeletal: Negative for back pain.   Skin: Negative for rash.   Neurological: Negative for dizziness, seizures, syncope, weakness and headaches.   Psychiatric/Behavioral: Negative for confusion. The patient is nervous/anxious.        Physical Exam     Initial Vitals [02/15/18 1510]   BP Pulse Resp Temp SpO2   (!) 149/97 (!) 117 18 98.5 °F (36.9 °C) 100 %      MAP       114.33         Physical Exam    Nursing note and vitals reviewed.  Constitutional: Vital signs are normal. He appears well-developed and well-nourished. He is not diaphoretic. He is cooperative.  Non-toxic appearance. He does not have a sickly appearance. He does not appear ill. No distress.   HENT:   Head: Normocephalic and atraumatic.   Right Ear: External ear normal.   Left Ear: External ear normal.   Nose: Nose normal.   Mouth/Throat: Oropharynx is clear and moist. No oropharyngeal exudate.   Eyes: Conjunctivae and EOM are normal. Pupils are equal, round, and reactive to light.   Neck: Normal range of motion. Neck supple.   Cardiovascular: Normal rate, regular rhythm, normal heart sounds and intact distal pulses. Exam reveals no gallop and no friction rub.    No murmur heard.  Pulmonary/Chest: Breath sounds normal. No respiratory distress. He has no wheezes. He has no rhonchi. He has no rales. He exhibits no mass, no tenderness, no bony tenderness and no retraction.   Abdominal: Soft. Bowel sounds are normal. He exhibits no distension. There is no tenderness. There is no rebound and no guarding.   Musculoskeletal: Normal range of motion.   Neurological: He is alert and oriented to person, place, and time. He has normal strength. No cranial nerve deficit or sensory deficit. GCS eye subscore is 4. GCS  verbal subscore is 5. GCS motor subscore is 6.   Skin: Skin is warm and dry. Capillary refill takes less than 2 seconds. No rash noted.   Psychiatric: He has a normal mood and affect. His speech is normal and behavior is normal. Judgment and thought content normal. Cognition and memory are normal.         ED Course   Procedures  Labs Reviewed   CBC W/ AUTO DIFFERENTIAL - Abnormal; Notable for the following:        Result Value    MCV 81 (*)     MCHC 38.0 (*)     Gran% 74.4 (*)     Lymph% 17.2 (*)     All other components within normal limits   COMPREHENSIVE METABOLIC PANEL - Abnormal; Notable for the following:     CO2 30 (*)     All other components within normal limits   TSH   DRUG SCREEN PANEL, URINE EMERGENCY     EKG Readings: (Independently Interpreted)   Initial Reading: No STEMI. Rhythm: Normal Sinus Rhythm. Ectopy: No Ectopy. Conduction: Normal.       X-Rays:   Independently Interpreted Readings:   Other Readings:  X-ray unrevealing for acute cardiopulmonary process.    Medical Decision Making:   Initial Assessment:   This is an evaluation of a 27 y.o. male that presents to the Emergency Department for chest pain.  She reports left-sided chest discomfort for 2 weeks that is intermittent and described as a dull squeezing pain.  He also reports intermittent palpitations and shortness of breath.  He denies any recent travel, prior clots, lower extremity edema or exogenous hormone use.  He denies any illicit drug use or excess caffeine use.  He reports having an EGD procedure this past Thursday with no complications.  He reports that he recently stopped taking his PPI secondary to resolution of peptic ulcer. He reports that he has been feeling anxious.  Physical Exam shows a non-toxic, afebrile, and well appearing male.  There is no tenderness to palpation of the chest wall.  There is no mass or sign of injury.  No axillary lymphadenopathy.  No chest retraction.  Lungs are clear to auscultation bilaterally, no  "wheezing, rales or rhonchi.  No evidence of respiratory distress.  Regular rate and rhythm, no murmurs, gallops or rubs.  Abdomen is soft and nontender.  Bowel sounds are appreciated.    Vital Signs Are Reassuring. If available, previous records reviewed.   RESULTS: CBC and CMP unrevealing; TSH 1.045, drug screen panel.  EKG shows normal sinus rhythm with tachycardia, pulse 114 bpm.  Chest x-ray unrevealing for acute cardiac or pulmonary process.    My overall impression is Non-cardiac chest pain, tachycardia and anxiety. I considered, but at this time, do not suspect pneumonia, pneumothorax, pleural effusion, emergent arrhythmia, STEMI.    ED Course: IV fluids; tachycardia resolved. Pulse 95. I discussed that all results came back negative with the patient. Patient admits to being under a lot of stress because his wife had surgery and they have a  at home.  He also admits that he is in the process of applying to graduate school and moving to [place].  He states that he feels much better now that he knows "that nothing is wrong."The diagnosis, treatment plan, instructions for follow-up and reevaluation with PCP as well as ED return precautions were discussed and understanding was verbalized. All questions or concerns have been addressed. Patient was discharged home with an instructional sheet which gave not only information regarding the most likely diagnoses but also information regarding when to return to the emergency department for alarming symptoms and when to seek further care.      This case was discussed with Dr. Rice who is in agreement with my assessment and plan.     Jessica La PA-C    Clinical Tests:   Lab Tests: Ordered and Reviewed  Radiological Study: Ordered and Reviewed  Medical Tests: Ordered and Reviewed            Scribe Attestation:   Scribe #1: I performed the above scribed service and the documentation accurately describes the services I performed. I attest to the accuracy of " the note.    Attending Attestation:           Physician Attestation for Scribe:  Physician Attestation Statement for Scribe #1: I, Jessica Martinez PA-C, reviewed documentation, as scribed by Arline Woods in my presence, and it is both accurate and complete.                    Clinical Impression:   The primary encounter diagnosis was Chest pain. Diagnoses of Anxiety and Chest pain, non-cardiac were also pertinent to this visit.    Disposition:   Disposition: Discharged  Condition: Stable                        Jessica Martinez PA-C  02/15/18 2010       Shamar Rice MD  02/15/18 2049

## 2018-02-16 NOTE — DISCHARGE INSTRUCTIONS
Your EKG, chest x-ray and labs were all normal today.    Make sure you are drinking lots of fluids and avoid strenuous exercise/lifting.    Please make a follow-up appointment with your primary care provider to discuss your anxiety.    Return to this emergency department for any new or concerning symptoms.

## 2018-02-17 ENCOUNTER — PATIENT MESSAGE (OUTPATIENT)
Dept: NEUROLOGY | Facility: CLINIC | Age: 28
End: 2018-02-17

## 2018-02-17 DIAGNOSIS — S16.1XXA STRAIN OF NECK MUSCLE, INITIAL ENCOUNTER: Primary | ICD-10-CM

## 2018-02-19 ENCOUNTER — TELEPHONE (OUTPATIENT)
Dept: SPORTS MEDICINE | Facility: CLINIC | Age: 28
End: 2018-02-19

## 2018-02-19 NOTE — TELEPHONE ENCOUNTER
Staff message sent to Dr. Lewis's staff asking for help scheduling evaluation ordered by Dr. Dhaliwal

## 2018-02-19 NOTE — TELEPHONE ENCOUNTER
----- Message from Alex RICHARDSON MA sent at 2/19/2018 10:24 AM CST -----  Good morning, Dr Dhaliwal just put a referral in for this patient to see Dr. Lewis. Thanks for your help getting him scheduled.

## 2018-02-21 ENCOUNTER — TELEPHONE (OUTPATIENT)
Dept: SPORTS MEDICINE | Facility: CLINIC | Age: 28
End: 2018-02-21

## 2018-02-21 NOTE — TELEPHONE ENCOUNTER
----- Message from Vannesa Beasley sent at 2/21/2018 11:20 AM CST -----  Contact: self   Pt is returning a call from Joy. He stated he called on Monday and trying to reach out to her again. Please call him at [phone]

## 2018-02-27 ENCOUNTER — OFFICE VISIT (OUTPATIENT)
Dept: SPORTS MEDICINE | Facility: CLINIC | Age: 28
End: 2018-02-27
Payer: COMMERCIAL

## 2018-02-27 ENCOUNTER — HOSPITAL ENCOUNTER (OUTPATIENT)
Dept: RADIOLOGY | Facility: HOSPITAL | Age: 28
Discharge: HOME | End: 2018-02-27
Attending: FAMILY MEDICINE
Payer: COMMERCIAL

## 2018-02-27 VITALS — TEMPERATURE: 98 F | WEIGHT: 138 LBS | HEIGHT: 67 IN | BODY MASS INDEX: 21.66 KG/M2

## 2018-02-27 DIAGNOSIS — R42 INTERMITTENT LIGHTHEADEDNESS: ICD-10-CM

## 2018-02-27 DIAGNOSIS — M54.2 CERVICAL SPINE PAIN: ICD-10-CM

## 2018-02-27 DIAGNOSIS — M54.2 CERVICAL SPINE PAIN: Primary | ICD-10-CM

## 2018-02-27 PROCEDURE — 99999 PR PBB SHADOW E&M-EST. PATIENT-LVL III: CPT | Mod: PBBFAC,,, | Performed by: FAMILY MEDICINE

## 2018-02-27 PROCEDURE — 72050 X-RAY EXAM NECK SPINE 4/5VWS: CPT | Mod: 26,,, | Performed by: RADIOLOGY

## 2018-02-27 PROCEDURE — 3008F BODY MASS INDEX DOCD: CPT | Mod: S$GLB,,, | Performed by: FAMILY MEDICINE

## 2018-02-27 PROCEDURE — 99203 OFFICE O/P NEW LOW 30 MIN: CPT | Mod: S$GLB,,, | Performed by: FAMILY MEDICINE

## 2018-02-27 PROCEDURE — 72050 X-RAY EXAM NECK SPINE 4/5VWS: CPT | Mod: TC,FY,PO

## 2018-02-27 NOTE — PROGRESS NOTES
Jae Nassar, a 27 y.o. male, presents today for evaluation of his CERVICAL spine.    This patient visit is a consult from the following provider: Clive Dhaliwal  Today's office visit notes will be made available to the consulting/refering provider via the mail, a fax, and/or an in basket message through the electronic medical record    New patient     HISTORY OF PRESENT ILLNESS   Location: cervical  Onset: sudden, ~ May 2017   Palliative:    Relative rest   Oral analgesics   FPT, @ [facility]  Provocative:    Working out  Prior: see below   Progression: worsening discomfort  Quality:    Sharp pain   Lightheadedness   Radiation:    R upper extremity numbness, pain, or weakness at times   Severity: per nursing documentation  Timing: constant, exacerbated w/ use  Trauma:    ~17.05: fell off a hoverboard --> concussion --> seen by neuro   ~17.08: lightheadedness --> seen by neuro    18.01: lightheadedness when working out   18.02.15: O ED for chest pain --> negative EKG --> ruled noncardiac event    18.02.17: lightheadedness after jogging --> given referral to [facility]IN[facility] from [facility]     Review of systems (ROS):  A 10+ review of systems was performed with pertinent positives and negatives noted above in the history of present illness. Other systems were negative unless otherwise specified.    Physical Examination (PE):      Constitutional:     -Vital signs: heart rate, blood pressure, and weight: reviewed    -General appearance: no apparent distress  [Dermatalogic] Skin:    -Inspection: No acute lesions, ulceration, or induration of the skin noted about the area evaluated   -Palpation: No subcutaneous crepitus noted about the area evaluated  Musculoskeletal (o, p, r, st): CERVICAL SPINE    There is no step off with palpation of spinous processes.  Flexion does not produce local or radicular pain.  Extension does not produce local or radicular pain.  Lateral flexion/bending is symmetric and normal and does produce  local pain.LEFT ONLY   Right and left rotation is symmetric and normal and does produce local pain. LEFT ONLY     Strength:       Shoulder flexion: 5/5 bilaterally       Shoulder extension: 5/5 bilaterally       External Rotation: 5/5 bilaterally       Internal Rotation: 5/5 bilaterally      Sensation is symmetric in the UE bilaterally.    Cardiovascular:   Cardiac: RRR, no rubs, murmurs, or gallops   Carotid: no bruits   Pulm: LFCTAB   ABD: soft, NT  Neurologic:   -Examined extremitys sensation is appropriate     AAFP/FPM: Pocket Guide Documentation Guidelines, (c)2014    (79324=39+ bullets, 49481=08+ bullets)    Assessment:   #1 cervical strain, left  No evidence of cord pathology / myelopathy  No evidence of osseous pathology  No evidence of neurologic pathology  No evidence of vascular pathology    #2 presyncopal episodes during exercise   X ~6 months   -->consultation w/ O cardiology    Imaging studies reviewed:   X-ray spine cervical, 18.02    Plan:    #1  We discussed options including:  #1 watchful waiting  #2 continue physical therapy aimed at:   RoM cervical   Strengthening cervical stabilizers   Scapular stability   #3 injection therapy:   n/a  #4 MRI for further evaluation     The patient chooses #2    Pain management required: handout given  Bracing required:  Physical therapy required:    HEP, from fPT, continue as above  Activity (e.g. sports, work) restrictions: as tolerated   school/vocation: teacher (elementary)     Follow up after cardiology visit   Discuss progressive exercise program based on pt's goals  Should symptoms worsen or fail to resolve, consider:  Revisiting the above options

## 2018-02-27 NOTE — LETTER
February 27, 2018      Clive Dhaliwal III, MD  [address]  [address]  [facility] [address]           [facility]  [address]  [facility] [address]  Phone: [phone]          Patient: Jae Nassar   MR Number: [id number]   YOB: 1990   Date of Visit: 2/27/2018       Dear Dr. Clive Dhaliwal III:    Thank you for referring Jae Nassar to me for evaluation. Attached you will find relevant portions of my assessment and plan of care.    If you have questions, please do not hesitate to call me. I look forward to following Jae Nassar along with you.    Sincerely,    Joni Lewis MD    Enclosure  CC:  No Recipients    If you would like to receive this communication electronically, please contact [email or web address] or [phone] to request more information on [employer] access.    For providers and/or their staff who would like to refer a patient to [facility], please contact us through our one-stop-shop provider referral line, [facility], at [phone].    If you feel you have received this communication in error or would no longer like to receive these types of communications, please e-mail [email or web address]

## 2018-03-01 ENCOUNTER — OFFICE VISIT (OUTPATIENT)
Dept: CARDIOLOGY | Facility: CLINIC | Age: 28
End: 2018-03-01
Payer: COMMERCIAL

## 2018-03-01 VITALS
HEART RATE: 84 BPM | WEIGHT: 139.13 LBS | BODY MASS INDEX: 21.84 KG/M2 | DIASTOLIC BLOOD PRESSURE: 80 MMHG | HEIGHT: 67 IN | SYSTOLIC BLOOD PRESSURE: 115 MMHG

## 2018-03-01 DIAGNOSIS — R07.89 OTHER CHEST PAIN: Primary | ICD-10-CM

## 2018-03-01 DIAGNOSIS — R42 EPISODIC LIGHTHEADEDNESS: ICD-10-CM

## 2018-03-01 PROCEDURE — 99213 OFFICE O/P EST LOW 20 MIN: CPT | Mod: S$GLB,,, | Performed by: INTERNAL MEDICINE

## 2018-03-01 PROCEDURE — 99999 PR PBB SHADOW E&M-EST. PATIENT-LVL III: CPT | Mod: PBBFAC,,, | Performed by: INTERNAL MEDICINE

## 2018-03-01 NOTE — PROGRESS NOTES
Subjective:       Patient ID: Jae Nassar is a 27 y.o. male.    Chief Complaint: Dizziness and Chest Pain (Ed f/u 2/15/18)    HPI     Mr. 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 He stated that he had started exercising again and may have overdone it prior to that visit. For about a week, the pain was on and off and he went to the ED when the chest pain became constant. He had a normal EKG in the ED and due to pain in the left chest as well as bilateral clavicular area, he was diagnosed with MSK pain and was discharged from the ED. Since then, he stated that the pain lasted for another 2-3 weeks and now he only feels the pain occasionally. He states the pain was never that bad and never had an exertional component to it. He still noticed the pain at rest. He also has a burning chest pain that he gets when lying down that lasts for a few minutes before going away. He is on and off of protonix at this point.     He also notes lightheadedness and nausea that used to be constant, now only occurs with exertion. He states that it happens at random times during exercise.    Review of Systems   Constitutional: Negative for appetite change, chills, fatigue and fever.   HENT: Negative.    Eyes: Negative.    Respiratory: Negative for cough and shortness of breath.    Cardiovascular: Positive for chest pain. Negative for palpitations and leg swelling.   Gastrointestinal: Positive for nausea. Negative for abdominal pain and vomiting.   Genitourinary: Negative for dysuria and urgency.   Musculoskeletal: Negative for arthralgias and myalgias.   Skin: Negative for color change and rash.   Neurological: Positive for dizziness and light-headedness. Negative for headaches.  "  Psychiatric/Behavioral: Negative.        Objective:     Vitals:    03/01/18 0813   BP: 115/80   BP Location: Left arm   Patient Position: Sitting   BP Method: Medium (Automatic)   Pulse: 84   Weight: 63.1 kg (139 lb 1.8 oz)   Height: 5' 7" (1.702 m)       Physical Exam   Constitutional: Vital signs are normal. He appears well-developed and well-nourished. He is active and cooperative. No distress.   HENT:   Head: Normocephalic and atraumatic.   Right Ear: Hearing and external ear normal.   Left Ear: Hearing and external ear normal.   Nose: Nose normal.   Neck: Trachea normal. No tracheal tenderness present. No thyroid mass and no thyromegaly present.   Cardiovascular: Normal rate, regular rhythm, S1 normal, S2 normal, normal heart sounds and normal pulses.  Exam reveals no gallop.    No murmur heard.  Pulmonary/Chest: Effort normal and breath sounds normal. No respiratory distress. He has no decreased breath sounds. He has no wheezes. He has no rhonchi. He has no rales.   Abdominal: Soft. Normal appearance.   Skin: Skin is warm, dry and intact.       Assessment:       1. Other chest pain    2. Episodic lightheadedness        Plan:     #Non-cardiac Chest Pain  --features and pattern are most consistent with MSK source  --EKG normal without any ischemic changes  ---low risk features: non-smoker, only family history of MI was a grandfather in his 70s, no past medical history of DM, HTN, or HLD.  --recommend further exercise as tolerated and encouragement provided for the patient  --Mediterranean diet handout provided for the patient    #Lightheadedness  --only occurs with exertion. Would suspect that this is still residual post-concussive symptoms  --baseline HR from 70-80s.   --no cardiac workup for lightheadedness is necessary at this point.    Staff addendum to follow. Follow up PRADEN Mathew MD  Cardiology PGY5  "

## 2018-03-09 ENCOUNTER — PATIENT MESSAGE (OUTPATIENT)
Dept: NEUROLOGY | Facility: CLINIC | Age: 28
End: 2018-03-09

## 2018-03-09 ENCOUNTER — PATIENT MESSAGE (OUTPATIENT)
Dept: SPORTS MEDICINE | Facility: CLINIC | Age: 28
End: 2018-03-09

## 2018-03-09 DIAGNOSIS — R42 LIGHTHEADEDNESS: Primary | ICD-10-CM

## 2018-03-09 NOTE — TELEPHONE ENCOUNTER
Left voicemail.     [facility]     ==    Pt needs to schedule cervical spine MRI as his lightheadedness is not a result of concussion (per Dr. Dhaliwal) nor CVD (per cardiology).     Dr. Lewis will call pt c MRI results/plan.

## 2018-03-15 ENCOUNTER — HOSPITAL ENCOUNTER (OUTPATIENT)
Dept: RADIOLOGY | Facility: HOSPITAL | Age: 28
Discharge: HOME | End: 2018-03-15
Attending: FAMILY MEDICINE
Payer: COMMERCIAL

## 2018-03-15 DIAGNOSIS — R42 LIGHTHEADEDNESS: ICD-10-CM

## 2018-03-15 PROCEDURE — 72141 MRI NECK SPINE W/O DYE: CPT | Mod: 26,,, | Performed by: RADIOLOGY

## 2018-03-15 PROCEDURE — 72141 MRI NECK SPINE W/O DYE: CPT | Mod: TC

## 2018-03-16 ENCOUNTER — PATIENT MESSAGE (OUTPATIENT)
Dept: NEUROLOGY | Facility: CLINIC | Age: 28
End: 2018-03-16

## 2018-03-20 ENCOUNTER — TELEPHONE (OUTPATIENT)
Dept: SPORTS MEDICINE | Facility: CLINIC | Age: 28
End: 2018-03-20

## 2018-03-20 NOTE — TELEPHONE ENCOUNTER
Spoke c pt.     Reviewed results of cervical spine MRI per Dr. Lewis.     Scheduled f/u appt c Dr. Lewis 03/27/18.

## 2018-03-28 ENCOUNTER — OFFICE VISIT (OUTPATIENT)
Dept: SPORTS MEDICINE | Facility: CLINIC | Age: 28
End: 2018-03-28
Payer: COMMERCIAL

## 2018-03-28 VITALS — TEMPERATURE: 98 F | BODY MASS INDEX: 21.97 KG/M2 | WEIGHT: 140 LBS | HEIGHT: 67 IN

## 2018-03-28 DIAGNOSIS — S16.1XXD STRAIN OF NECK MUSCLE, SUBSEQUENT ENCOUNTER: Primary | ICD-10-CM

## 2018-03-28 DIAGNOSIS — R42 LIGHTHEADEDNESS: ICD-10-CM

## 2018-03-28 PROCEDURE — 99214 OFFICE O/P EST MOD 30 MIN: CPT | Mod: S$GLB,,, | Performed by: FAMILY MEDICINE

## 2018-03-28 PROCEDURE — 99999 PR PBB SHADOW E&M-EST. PATIENT-LVL III: CPT | Mod: PBBFAC,,, | Performed by: FAMILY MEDICINE

## 2018-03-28 NOTE — PROGRESS NOTES
Jae Nassar, a 27 y.o. male, presents today for evaluation of his CERVICAL spine.    HISTORY OF PRESENT ILLNESS   Location: cervical  Onset: sudden, ~ May 2017   Palliative:    Relative rest   Oral analgesics   FPT, @ [facility], 2017  Provocative:    Working out  Prior: see below   Progression: plateau discomfort  Quality:    Sharp pain   Lightheadedness with exertion   Radiation:    R upper extremity numbness, pain, or weakness at times   Severity: per nursing documentation  Timing: constant, exacerbated w/ use  Trauma:    ~17.05: fell off a hoverboard --> concussion --> seen by neuro   ~17.08: lightheadedness --> seen by neuro    18.01: lightheadedness when working out   18.02.15: O ED for chest pain --> negative EKG --> ruled noncardiac event    18.02.17: lightheadedness after jogging --> given referral to [facility] from [facility]    18.03.01: O cardiology --> no cardiac workup as they feel lightheadedness is related to post-concussive symptoms     Review of systems (ROS):  A 10+ review of systems was performed with pertinent positives and negatives noted above in the history of present illness. Other systems were negative unless otherwise specified.    Physical Examination (PE):      Constitutional:     -Vital signs: heart rate, blood pressure, and weight: reviewed    -General appearance: no apparent distress  [Dermatalogic] Skin:    -Inspection: No acute lesions, ulceration, or induration of the skin noted about the area evaluated   -Palpation: No subcutaneous crepitus noted about the area evaluated  Musculoskeletal (o, p, r, st): CERVICAL SPINE    There is no step off with palpation of spinous processes.  Flexion does not produce local or radicular pain.  Extension does not produce local or radicular pain.  Lateral flexion/bending is symmetric and normal and does produce local pain.LEFT ONLY   Right and left rotation is symmetric and normal and does produce local pain. LEFT ONLY     Strength:       Shoulder  flexion: 5/5 bilaterally       Shoulder extension: 5/5 bilaterally       External Rotation: 5/5 bilaterally       Internal Rotation: 5/5 bilaterally      Sensation is symmetric in the UE bilaterally.    Cardiovascular:   Cardiac: RRR, no rubs, murmurs, or gallops   Carotid: no bruits   Pulm: LFCTAB   ABD: soft, NT  Neurologic:   -Examined extremitys sensation is appropriate     AAFP/FPM: Pocket Guide Documentation Guidelines, (c)2014    (15154=76+ bullets, 96047=22+ bullets)    Assessment:   #1 pre syncopal episodes   Likely subsequent to excessive physical exertion during exercise   Neuro pathology r/o by [facility] neurology   Cardiac pathology r/o by [facility] cardiology    #2 cervical strain, left   W/ reassuring imaging    No evidence of cord pathology / myelopathy  No evidence of osseous pathology  No evidence of neurologic pathology  No evidence of vascular pathology    Imaging studies reviewed:   X-ray spine cervical, 18.02  MRI spine cervical, 18.03    Plan:    Discussed reassuring evaluations from colleague providers. Discussed how we could re start fPT to assist w/ low-grade cervical and lumbar discomforts. Pt is moving to [place], [place] soon and defers fPT for now. He will continue to challenge himself on the stationary bike, and report any adverse effects. Again discussed progressive exercise program based on pt's goals.    We discussed options including:  #1 watchful waiting  #2 re start physical therapy aimed at:   RoM cervical   Strengthening cervical stabilizers   Scapular stability   #3 injection therapy:   n/a  #4      The patient chooses #1    Pain management required: handout given  Bracing required:  Physical therapy required:    HEP, from fPT, prior as above  Activity (e.g. 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

## 2018-05-08 ENCOUNTER — OFFICE VISIT (OUTPATIENT)
Dept: FAMILY MEDICINE | Facility: CLINIC | Age: 28
End: 2018-05-08
Payer: COMMERCIAL

## 2018-05-08 ENCOUNTER — LAB VISIT (OUTPATIENT)
Dept: LAB | Facility: HOSPITAL | Age: 28
End: 2018-05-08
Attending: FAMILY MEDICINE
Payer: COMMERCIAL

## 2018-05-08 VITALS
HEART RATE: 89 BPM | WEIGHT: 140.63 LBS | BODY MASS INDEX: 22.07 KG/M2 | TEMPERATURE: 98 F | SYSTOLIC BLOOD PRESSURE: 110 MMHG | HEIGHT: 67 IN | DIASTOLIC BLOOD PRESSURE: 88 MMHG

## 2018-05-08 DIAGNOSIS — R35.89 POLYURIA: ICD-10-CM

## 2018-05-08 DIAGNOSIS — R35.0 URINARY FREQUENCY: ICD-10-CM

## 2018-05-08 DIAGNOSIS — Z00.00 ROUTINE GENERAL MEDICAL EXAMINATION AT A HEALTH CARE FACILITY: ICD-10-CM

## 2018-05-08 DIAGNOSIS — Z00.00 ROUTINE GENERAL MEDICAL EXAMINATION AT A HEALTH CARE FACILITY: Primary | ICD-10-CM

## 2018-05-08 LAB
BILIRUB SERPL-MCNC: NORMAL MG/DL
BLOOD URINE, POC: NORMAL
CHOLEST SERPL-MCNC: 139 MG/DL
CHOLEST/HDLC SERPL: 4 {RATIO}
COLOR, POC UA: YELLOW
GLUCOSE UR QL STRIP: NORMAL
HDLC SERPL-MCNC: 35 MG/DL
HDLC SERPL: 25.2 %
KETONES UR QL STRIP: NORMAL
LDLC SERPL CALC-MCNC: 89 MG/DL
LEUKOCYTE ESTERASE URINE, POC: NORMAL
MAGNESIUM SERPL-MCNC: 2.2 MG/DL
NITRITE, POC UA: NORMAL
NONHDLC SERPL-MCNC: 104 MG/DL
PH, POC UA: 7
PROTEIN, POC: NORMAL
SPECIFIC GRAVITY, POC UA: 1
TRIGL SERPL-MCNC: 75 MG/DL
UROBILINOGEN, POC UA: NORMAL

## 2018-05-08 PROCEDURE — 83735 ASSAY OF MAGNESIUM: CPT

## 2018-05-08 PROCEDURE — 80061 LIPID PANEL: CPT

## 2018-05-08 PROCEDURE — 36415 COLL VENOUS BLD VENIPUNCTURE: CPT | Mod: PO

## 2018-05-08 PROCEDURE — 99385 PREV VISIT NEW AGE 18-39: CPT | Mod: 25,S$GLB,, | Performed by: FAMILY MEDICINE

## 2018-05-08 PROCEDURE — 99999 PR PBB SHADOW E&M-EST. PATIENT-LVL III: CPT | Mod: PBBFAC,,, | Performed by: FAMILY MEDICINE

## 2018-05-08 PROCEDURE — 81002 URINALYSIS NONAUTO W/O SCOPE: CPT | Mod: S$GLB,,, | Performed by: FAMILY MEDICINE

## 2018-05-08 RX ORDER — PANTOPRAZOLE SODIUM 20 MG/1
20 TABLET, DELAYED RELEASE ORAL DAILY
Qty: 30 TABLET | Refills: 11 | Status: SHIPPED | OUTPATIENT
Start: 2018-05-08 | End: 2021-03-11

## 2018-05-08 RX ORDER — AMOXICILLIN 500 MG
CAPSULE ORAL DAILY
COMMUNITY
End: 2021-03-11

## 2018-05-08 NOTE — PROGRESS NOTES
Subjective:       Patient ID: Jae Nassar is a 27 y.o. male.    Chief Complaint: Annual Exam    Disclaimer: This note has been generated using voice-recognition software. There may be typographical errors that have been missed during proof-reading    28 yo presents today for physical exam and to establish care with me as new PCP.  Last exam one year ago  Immunizations:  UTD (had TdaP prior to daughter's birth 12/17)  Prior history of duodenal ulcer after taking NSAIDs for post concussion headaches.  Had repeat EGD 2/18 showing only gastritis      Review of Systems   Constitutional: Positive for activity change and unexpected weight change. Negative for appetite change, chills, fatigue and fever.   HENT: Negative for congestion, ear discharge, ear pain, hearing loss, rhinorrhea, sinus pressure and trouble swallowing.    Eyes: Negative for pain, discharge and visual disturbance.   Respiratory: Positive for chest tightness. Negative for cough, shortness of breath and wheezing.    Cardiovascular: Positive for chest pain and palpitations. Negative for leg swelling.   Gastrointestinal: Positive for constipation and diarrhea. Negative for abdominal distention, abdominal pain, blood in stool and vomiting.   Endocrine: Positive for polydipsia and polyuria.   Genitourinary: Negative for difficulty urinating, dysuria, frequency, hematuria and urgency.   Musculoskeletal: Positive for neck pain. Negative for arthralgias, back pain, gait problem, joint swelling, myalgias and neck stiffness.   Skin: Negative for rash.   Neurological: Negative for dizziness, tremors, speech difficulty, weakness, numbness and headaches.   Psychiatric/Behavioral: Positive for dysphoric mood. Negative for agitation, behavioral problems and confusion.       Objective:      Physical Exam   Constitutional: He is oriented to person, place, and time. He appears well-developed and well-nourished.   HENT:   Head: Normocephalic.   Right Ear:  Tympanic membrane and external ear normal.   Left Ear: Tympanic membrane and external ear normal.   Nose: Nose normal.   Mouth/Throat: Uvula is midline, oropharynx is clear and moist and mucous membranes are normal.   Eyes: Conjunctivae and EOM are normal. Pupils are equal, round, and reactive to light.   Neck: Normal range of motion. Neck supple. No JVD present. Carotid bruit is not present. No thyroid mass and no thyromegaly present.   Cardiovascular: Normal rate, regular rhythm and normal heart sounds.    No murmur heard.  Pulmonary/Chest: Effort normal and breath sounds normal. He has no rales.   Abdominal: Soft. Bowel sounds are normal. He exhibits no mass. There is no tenderness. Hernia confirmed negative in the right inguinal area and confirmed negative in the left inguinal area.   Genitourinary: Penis normal. Right testis shows tenderness. Right testis shows no mass. Left testis shows no mass and no tenderness.   Genitourinary Comments: Slight tenderness to palpation right epididymis, no mass   Musculoskeletal: Normal range of motion. He exhibits no edema.   Lymphadenopathy:     He has no cervical adenopathy. No inguinal adenopathy noted on the right or left side.   Neurological: He is alert and oriented to person, place, and time. He has normal reflexes. No cranial nerve deficit.   Skin: Skin is warm. No lesion and no rash noted.   Psychiatric: He has a normal mood and affect.       Assessment:       1.  Physical exam  2.  History of duodenal ulcer  3.  Post concussion syndrome, improved   Plan:       1.  Labs reviewed with pt  2.  Magnesium, lipid profile

## 2018-05-24 ENCOUNTER — PATIENT MESSAGE (OUTPATIENT)
Dept: NEUROLOGY | Facility: CLINIC | Age: 28
End: 2018-05-24

## 2018-05-25 ENCOUNTER — PATIENT MESSAGE (OUTPATIENT)
Dept: NEUROLOGY | Facility: CLINIC | Age: 28
End: 2018-05-25

## 2018-05-25 ENCOUNTER — TELEPHONE (OUTPATIENT)
Dept: NEUROLOGY | Facility: CLINIC | Age: 28
End: 2018-05-25

## 2018-05-25 NOTE — TELEPHONE ENCOUNTER
Spoke with pt and pt is schedule to come in 5/29 he is aware Dr. Dhaliwal is on call until Monday.

## 2018-05-27 ENCOUNTER — PATIENT MESSAGE (OUTPATIENT)
Dept: NEUROLOGY | Facility: CLINIC | Age: 28
End: 2018-05-27

## 2018-05-27 ENCOUNTER — HOSPITAL ENCOUNTER (EMERGENCY)
Facility: HOSPITAL | Age: 28
Discharge: HOME | End: 2018-05-27
Attending: EMERGENCY MEDICINE
Payer: COMMERCIAL

## 2018-05-27 VITALS
RESPIRATION RATE: 18 BRPM | TEMPERATURE: 99 F | HEIGHT: 67 IN | HEART RATE: 87 BPM | OXYGEN SATURATION: 95 % | WEIGHT: 140 LBS | DIASTOLIC BLOOD PRESSURE: 91 MMHG | SYSTOLIC BLOOD PRESSURE: 134 MMHG | BODY MASS INDEX: 21.97 KG/M2
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
## 2018-05-27 PROCEDURE — 99283 EMERGENCY DEPT VISIT LOW MDM: CPT

## 2018-05-28 NOTE — ED PROVIDER NOTES
Encounter Date: 5/27/2018       History     Chief Complaint   Patient presents with    Headache     fall x 4 days ago, denies hitting head.  Hx Concussive disorder.  states over last 2-3 days has pressure in head getting worse today.  + nausea and vomiting x 1 today.       The patient is here because 4 days ago he was playing basketball and he tripped.  He fell to the ground.  He denies striking his head.  No loss of consciousness.  No seizures.  No drug or alcohol since the fall.  He has a history of a concussion last year.  He has a neurologist will be seen on Tuesday.  Today is having some dull head pain.  Not worst of life not thunderclap.  Diffuse.  Very dull.  He does not want anything for pain. He vomited once.  Patient reports sometimes he is anxious and vomits.  Able to tolerate p.o. up until today and after vomiting 1 time.  No focal neurological deficit.  No stiff neck or photophobia.  No fever.  No direct trauma to the head.  Patient is not on blood thinners.  No significant headache.          Review of patient's allergies indicates:   Allergen Reactions    Pcn [penicillins]      Past Medical History:   Diagnosis Date    Duodenum ulcer 08/2017    Headache      Past Surgical History:   Procedure Laterality Date    ORTHOPEDIC SURGERY Left 2010    labrum repair    SHOULDER SURGERY       History reviewed. No pertinent family history.  Social History   Substance Use Topics    Smoking status: Never Smoker    Smokeless tobacco: Never Used    Alcohol use No     Review of Systems   Constitutional: Negative for activity change, appetite change, chills, diaphoresis, fatigue, fever and unexpected weight change.   HENT: Negative.  Negative for congestion.    Eyes: Negative.  Negative for pain, discharge, redness and itching.   Respiratory: Negative.  Negative for apnea and chest tightness.    Cardiovascular: Negative.  Negative for chest pain.   Gastrointestinal: Positive for vomiting. Negative for abdominal  distention and abdominal pain.   Endocrine: Negative.    Genitourinary: Negative.    Musculoskeletal: Negative.    Skin: Negative.    Allergic/Immunologic: Negative.    Neurological: Positive for headaches. Negative for dizziness, seizures, facial asymmetry, light-headedness and numbness.   Hematological: Negative.    Psychiatric/Behavioral: Negative.    All other systems reviewed and are negative.      Physical Exam     Initial Vitals [05/27/18 1910]   BP Pulse Resp Temp SpO2   135/88 96 15 98.3 °F (36.8 °C) 98 %      MAP       103.67         Physical Exam    Nursing note and vitals reviewed.  Constitutional: He appears well-developed.   HENT:   Head: Normocephalic and atraumatic.   Eyes: Pupils are equal, round, and reactive to light.   Neck: Normal range of motion.   Cardiovascular: Normal rate, regular rhythm, normal heart sounds and intact distal pulses.   Pulmonary/Chest: Breath sounds normal.   Abdominal: Soft. Bowel sounds are normal.   Neurological: He is alert and oriented to person, place, and time. He has normal strength. No cranial nerve deficit or sensory deficit.   Cranial nerves intact.  Two hundred twelve cranial nerves intact.  Motor strength sensation intact all extremities. Normal finger-to-nose.  No pronator drift.         ED Course   Procedures  Labs Reviewed - No data to display                       Attending Attestation:   Physician Attestation Statement for Resident:  As the supervising MD . -: Patient had a mechanical slip and fall 4 days ago.  He did not strike his head.  He is concerned because he vomited once today.  He has no abdominal complaints.  Normal abdominal exam.  Normal neurological exam.  No altered mental status.  No seizure.  No focal neurological deficits.  No headache that is significant.  He has an appointment with Neurology on Tuesday.  Long discussion with patient about pros and cons of CT scan.    THE PATIENT DOES NOT WANT CAT SCAN.  HE DID NOT HIT HIS HEAD.  HE HAS  NO SIGNIFICANT HEADACHE.  HE WILL FOLLOW UP WITH NEUROLOGY.  HE UNDERSTANDS WHEN TO RETURN AND WHEN  TO FOLLOW-UP.                      Clinical Impression: FALL   The encounter diagnosis was Fall, initial encounter.                           Sangeetha Leach MD  06/12/18 6103

## 2018-05-28 NOTE — ED TRIAGE NOTES
Pt cc HA Pt states he was playing basketball and had a trip and fall Pt also has pressure in head getting worse today.  nausea and vomiting for 1 today

## 2018-05-29 ENCOUNTER — OFFICE VISIT (OUTPATIENT)
Dept: NEUROLOGY | Facility: CLINIC | Age: 28
End: 2018-05-29
Payer: COMMERCIAL

## 2018-05-29 ENCOUNTER — PATIENT MESSAGE (OUTPATIENT)
Dept: FAMILY MEDICINE | Facility: CLINIC | Age: 28
End: 2018-05-29

## 2018-05-29 VITALS
SYSTOLIC BLOOD PRESSURE: 119 MMHG | WEIGHT: 138.44 LBS | BODY MASS INDEX: 21.73 KG/M2 | HEIGHT: 67 IN | HEART RATE: 84 BPM | DIASTOLIC BLOOD PRESSURE: 86 MMHG

## 2018-05-29 DIAGNOSIS — F41.9 ANXIETY: Primary | ICD-10-CM

## 2018-05-29 PROCEDURE — 99999 PR PBB SHADOW E&M-EST. PATIENT-LVL III: CPT | Mod: PBBFAC,,, | Performed by: PSYCHIATRY & NEUROLOGY

## 2018-05-29 PROCEDURE — 99214 OFFICE O/P EST MOD 30 MIN: CPT | Mod: S$GLB,,, | Performed by: PSYCHIATRY & NEUROLOGY

## 2018-05-29 PROCEDURE — 3008F BODY MASS INDEX DOCD: CPT | Mod: CPTII,S$GLB,, | Performed by: PSYCHIATRY & NEUROLOGY

## 2018-05-29 NOTE — PROGRESS NOTES
Subjective:       Patient ID: Jae Nassar is a 27 y.o. male.    Reason for Consult: Concussion      Interval History:  Jae Nassar is here for follow up. Their condition has changed.  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  He trips rolled onto his right elbow, left palm and onto his back.  There was no loss of consciousness and no initial jolt on his head.  He had no initial complaints.  He notes he was very anxious and had some lightheadedness and some weird feelings.  He notes that he felt the same ones in Thursday of last week and rested over the weekend should his home self off from his usual activities and staying in a dark room.  He notes he has been more anxious and has had some mild nausea.  He notes that on Sunday he went to the emergency room and was seen by the doctor there and was told that he likely has anxiety and they did not suspect that he had a concussion at all.  He notes that generally he feels okay until he worries and then he starts to have symptoms.  He also notes that he has been having lack of sleep so he has been trying melatonin.      Objective:     Vitals:    05/29/18 1157   BP: 119/86   Pulse: 84     Patient is awake alert oriented to person place and time.  Moves all 4 extremities against gravity.  Gait and station within normal limits.  Cranial nerves 2-12 were without focal deficits.  Point of convergence is less than 20 cm.  Estefany is normal 0/3/3.  Focused examination was undertaken today. Over 50% of face to face time of 25 minute visit time was in giving guidance, counseling and discussing treatment options.        Assessment/Plan:     Problem List Items Addressed This Visit     None      Visit Diagnoses     Anxiety    -  Primary        27-year-old male presents for evaluation of what he thought might be a concussion.  I find no signs or symptoms on today's examination that would make me believe that this is  a concussion.  I have discussed and counseled him that this may actually be a manifestation of his anxiety to which he admits has been 1 of his predominant symptoms.  At this time we have discussed mind fullness and a follow-up with his primary care doctor for possible trial of SSRI as appropriate. His plan is to moved to [place] in August so he can go to graduate school.  He is stressed about changes in his job, is new family addition and worry over his overall health.    I will follow up with them PRN.  The patient verbalizes understanding and agreement with the treatment plan. I have discussed risks, benefits and alternatives to the treatment plan. Questions were sought and answered to his stated verbal satisfaction.        Alley Dhaliwal MD    This note is dictated on Dragon Natural Speaking word recognition program. There are word recognition mistakes that are occasionally missed on review.

## 2018-05-31 ENCOUNTER — TELEPHONE (OUTPATIENT)
Dept: FAMILY MEDICINE | Facility: CLINIC | Age: 28
End: 2018-05-31

## 2018-05-31 NOTE — TELEPHONE ENCOUNTER
----- Message from Jessica Gallardo sent at 5/31/2018  8:30 AM CDT -----  Contact: Pt [phone]  Pt states that he has been communicating with Dr Kaufman on the portal and states that Dr Kaufman told him to call [facility] to get him an earlier appointment than June 18th. Please advise pt.

## 2018-06-04 ENCOUNTER — OFFICE VISIT (OUTPATIENT)
Dept: FAMILY MEDICINE | Facility: CLINIC | Age: 28
End: 2018-06-04
Payer: COMMERCIAL

## 2018-06-04 VITALS
SYSTOLIC BLOOD PRESSURE: 110 MMHG | DIASTOLIC BLOOD PRESSURE: 72 MMHG | TEMPERATURE: 98 F | BODY MASS INDEX: 21.97 KG/M2 | HEART RATE: 72 BPM | HEIGHT: 67 IN | WEIGHT: 140 LBS

## 2018-06-04 DIAGNOSIS — F41.9 ANXIETY: Primary | ICD-10-CM

## 2018-06-04 PROCEDURE — 99213 OFFICE O/P EST LOW 20 MIN: CPT | Mod: S$GLB,,, | Performed by: FAMILY MEDICINE

## 2018-06-04 PROCEDURE — 3008F BODY MASS INDEX DOCD: CPT | Mod: CPTII,S$GLB,, | Performed by: FAMILY MEDICINE

## 2018-06-04 PROCEDURE — 99999 PR PBB SHADOW E&M-EST. PATIENT-LVL III: CPT | Mod: PBBFAC,,, | Performed by: FAMILY MEDICINE

## 2018-06-04 RX ORDER — SERTRALINE HYDROCHLORIDE 50 MG/1
TABLET, FILM COATED ORAL
Qty: 30 TABLET | Refills: 11 | Status: SHIPPED | OUTPATIENT
Start: 2018-06-04 | End: 2019-03-11

## 2018-06-04 NOTE — PROGRESS NOTES
Subjective:       Patient ID: Jae Nassar is a 28 y.o. male.    Chief Complaint: Anxiety    Disclaimer: This note has been generated using voice-recognition software. There may be typographical errors that have been missed during proof-reading    27 yo presents today with on going symptoms of anxiety.  Multiple stress factors at present (moving to [place], stopping job, starting school,  at home)  Recently concerned re possible recurrence of post concussion syndrome.  Would like to start medication to help with transition to new life.  He denies any symptoms of depression.  No previous history of anxiety or depression      Anxiety   Symptoms include nervous/anxious behavior. Patient reports no confusion, decreased concentration or suicidal ideas.         Review of Systems   Psychiatric/Behavioral: Negative for agitation, behavioral problems, confusion, decreased concentration, hallucinations, self-injury, sleep disturbance and suicidal ideas. The patient is nervous/anxious.        Objective:      Physical Exam   Constitutional: He is oriented to person, place, and time.   Neurological: He is alert and oriented to person, place, and time.   Psychiatric: His speech is normal and behavior is normal. Judgment and thought content normal. His mood appears anxious. Cognition and memory are normal. He does not exhibit a depressed mood.       Assessment:       Situational disturbance   Plan:       1.  Start Zoloft 25mg with gradual increase as needed  2.  F/u 2 weeks

## 2018-06-11 ENCOUNTER — PATIENT MESSAGE (OUTPATIENT)
Dept: FAMILY MEDICINE | Facility: CLINIC | Age: 28
End: 2018-06-11

## 2018-06-20 ENCOUNTER — PATIENT MESSAGE (OUTPATIENT)
Dept: FAMILY MEDICINE | Facility: CLINIC | Age: 28
End: 2018-06-20

## 2018-06-22 ENCOUNTER — NURSE TRIAGE (OUTPATIENT)
Dept: ADMINISTRATIVE | Facility: CLINIC | Age: 28
End: 2018-06-22

## 2018-06-22 NOTE — TELEPHONE ENCOUNTER
Reason for Disposition   Unexplained nausea  (all triage questions negative)    Protocols used: ST NAUSEA-A-AH    Ms. Nassar states he drunk too much alcohol and a bachelor party. Patient experienced vomiting last night. This morning he is nauseous.

## 2019-01-03 ENCOUNTER — TELEPHONE (OUTPATIENT)
Dept: FAMILY MEDICINE | Facility: CLINIC | Age: 29
End: 2019-01-03

## 2019-01-03 NOTE — TELEPHONE ENCOUNTER
----- Message from Jessica Dunn sent at 1/3/2019  9:50 AM CST -----  Contact: Patient [phone]  He is applying for a medical certificate with the [facility] and he need a letter from you stating that he was diagnosed with anxiety, the medicine is controlling it, and he hasn't had any side effects. He said to please mail it to him in [place] at [address] [place] [phone].    Please call him to let him know once it done.    Thank you

## 2019-01-03 NOTE — TELEPHONE ENCOUNTER
Patient is aware that Dr. Kaufman will not return to clinic until Monday & is fine with waiting on the letter until then.

## 2019-01-07 ENCOUNTER — TELEPHONE (OUTPATIENT)
Dept: FAMILY MEDICINE | Facility: CLINIC | Age: 29
End: 2019-01-07

## 2019-01-07 NOTE — TELEPHONE ENCOUNTER
Patient advised that the letter was mailed & that he should establish with a new PCP in [place] for further information.

## 2019-01-07 NOTE — TELEPHONE ENCOUNTER
----- Message from Jennifer Beltran sent at 1/7/2019  4:38 PM CST -----  Contact: Pt [phone]  Patient is returning a phone call.  Who left a message for the patient: Patt LAU  Does patient know what this is regarding:    Comments:

## 2019-01-28 ENCOUNTER — PATIENT MESSAGE (OUTPATIENT)
Dept: FAMILY MEDICINE | Facility: CLINIC | Age: 29
End: 2019-01-28

## 2019-03-11 ENCOUNTER — LAB VISIT (OUTPATIENT)
Dept: LAB | Facility: HOSPITAL | Age: 29
End: 2019-03-11
Attending: FAMILY MEDICINE
Payer: COMMERCIAL

## 2019-03-11 ENCOUNTER — OFFICE VISIT (OUTPATIENT)
Dept: FAMILY MEDICINE | Facility: CLINIC | Age: 29
End: 2019-03-11
Payer: COMMERCIAL

## 2019-03-11 VITALS
BODY MASS INDEX: 24.05 KG/M2 | SYSTOLIC BLOOD PRESSURE: 110 MMHG | DIASTOLIC BLOOD PRESSURE: 80 MMHG | HEIGHT: 67 IN | WEIGHT: 153.25 LBS | TEMPERATURE: 98 F | RESPIRATION RATE: 20 BRPM

## 2019-03-11 DIAGNOSIS — Z00.00 ROUTINE GENERAL MEDICAL EXAMINATION AT A HEALTH CARE FACILITY: ICD-10-CM

## 2019-03-11 DIAGNOSIS — Z29.9 PREVENTIVE MEASURE: ICD-10-CM

## 2019-03-11 DIAGNOSIS — Z00.00 ROUTINE GENERAL MEDICAL EXAMINATION AT A HEALTH CARE FACILITY: Primary | ICD-10-CM

## 2019-03-11 LAB
ALBUMIN SERPL BCP-MCNC: 4.5 G/DL
ALP SERPL-CCNC: 80 U/L
ALT SERPL W/O P-5'-P-CCNC: 23 U/L
ANION GAP SERPL CALC-SCNC: 6 MMOL/L
AST SERPL-CCNC: 22 U/L
BASOPHILS # BLD AUTO: 0.06 K/UL
BASOPHILS NFR BLD: 0.7 %
BILIRUB SERPL-MCNC: 0.8 MG/DL
BUN SERPL-MCNC: 12 MG/DL
CALCIUM SERPL-MCNC: 9.9 MG/DL
CHLORIDE SERPL-SCNC: 103 MMOL/L
CHOLEST SERPL-MCNC: 156 MG/DL
CHOLEST/HDLC SERPL: 3.6 {RATIO}
CO2 SERPL-SCNC: 30 MMOL/L
CREAT SERPL-MCNC: 1.1 MG/DL
DIFFERENTIAL METHOD: NORMAL
EOSINOPHIL # BLD AUTO: 0.3 K/UL
EOSINOPHIL NFR BLD: 4.1 %
ERYTHROCYTE [DISTWIDTH] IN BLOOD BY AUTOMATED COUNT: 11.9 %
EST. GFR  (AFRICAN AMERICAN): >60 ML/MIN/1.73 M^2
EST. GFR  (NON AFRICAN AMERICAN): >60 ML/MIN/1.73 M^2
GLUCOSE SERPL-MCNC: 88 MG/DL
HCT VFR BLD AUTO: 46.6 %
HDLC SERPL-MCNC: 43 MG/DL
HDLC SERPL: 27.6 %
HGB BLD-MCNC: 16.3 G/DL
IMM GRANULOCYTES # BLD AUTO: 0.02 K/UL
IMM GRANULOCYTES NFR BLD AUTO: 0.2 %
LDLC SERPL CALC-MCNC: 98.4 MG/DL
LYMPHOCYTES # BLD AUTO: 1.5 K/UL
LYMPHOCYTES NFR BLD: 19.2 %
MCH RBC QN AUTO: 30.2 PG
MCHC RBC AUTO-ENTMCNC: 35 G/DL
MCV RBC AUTO: 87 FL
MONOCYTES # BLD AUTO: 0.6 K/UL
MONOCYTES NFR BLD: 7.6 %
NEUTROPHILS # BLD AUTO: 5.5 K/UL
NEUTROPHILS NFR BLD: 68.2 %
NONHDLC SERPL-MCNC: 113 MG/DL
NRBC BLD-RTO: 0 /100 WBC
PLATELET # BLD AUTO: 197 K/UL
PMV BLD AUTO: 11.8 FL
POTASSIUM SERPL-SCNC: 4.2 MMOL/L
PROT SERPL-MCNC: 7.5 G/DL
RBC # BLD AUTO: 5.39 M/UL
SODIUM SERPL-SCNC: 139 MMOL/L
TRIGL SERPL-MCNC: 73 MG/DL
TSH SERPL DL<=0.005 MIU/L-ACNC: 0.93 UIU/ML
WBC # BLD AUTO: 8.04 K/UL

## 2019-03-11 PROCEDURE — 90472 IMMUNIZATION ADMIN EACH ADD: CPT | Mod: S$GLB,,, | Performed by: FAMILY MEDICINE

## 2019-03-11 PROCEDURE — 99395 PREV VISIT EST AGE 18-39: CPT | Mod: 25,S$GLB,, | Performed by: FAMILY MEDICINE

## 2019-03-11 PROCEDURE — 99999 PR PBB SHADOW E&M-EST. PATIENT-LVL III: CPT | Mod: PBBFAC,,, | Performed by: FAMILY MEDICINE

## 2019-03-11 PROCEDURE — 86580 POCT TB SKIN TEST: ICD-10-PCS | Mod: 59,S$GLB,, | Performed by: FAMILY MEDICINE

## 2019-03-11 PROCEDURE — 85025 COMPLETE CBC W/AUTO DIFF WBC: CPT

## 2019-03-11 PROCEDURE — 90632 HEPATITIS A VACCINE ADULT IM: ICD-10-PCS | Mod: S$GLB,,, | Performed by: FAMILY MEDICINE

## 2019-03-11 PROCEDURE — 86787 VARICELLA-ZOSTER ANTIBODY: CPT

## 2019-03-11 PROCEDURE — 90472 MENINGOCOCCAL CONJUGATE VACCINE 4-VALENT IM (MENACTRA): ICD-10-PCS | Mod: S$GLB,,, | Performed by: FAMILY MEDICINE

## 2019-03-11 PROCEDURE — 90471 IMMUNIZATION ADMIN: CPT | Mod: S$GLB,,, | Performed by: FAMILY MEDICINE

## 2019-03-11 PROCEDURE — 90734 MENACWYD/MENACWYCRM VACC IM: CPT | Mod: S$GLB,,, | Performed by: FAMILY MEDICINE

## 2019-03-11 PROCEDURE — 36415 COLL VENOUS BLD VENIPUNCTURE: CPT | Mod: PO

## 2019-03-11 PROCEDURE — 84443 ASSAY THYROID STIM HORMONE: CPT

## 2019-03-11 PROCEDURE — 80053 COMPREHEN METABOLIC PANEL: CPT

## 2019-03-11 PROCEDURE — 80061 LIPID PANEL: CPT

## 2019-03-11 PROCEDURE — 90471 HEPATITIS A VACCINE ADULT IM: ICD-10-PCS | Mod: S$GLB,,, | Performed by: FAMILY MEDICINE

## 2019-03-11 PROCEDURE — 90734 MENINGOCOCCAL CONJUGATE VACCINE 4-VALENT IM (MENACTRA): ICD-10-PCS | Mod: S$GLB,,, | Performed by: FAMILY MEDICINE

## 2019-03-11 PROCEDURE — 86580 TB INTRADERMAL TEST: CPT | Mod: 59,S$GLB,, | Performed by: FAMILY MEDICINE

## 2019-03-11 PROCEDURE — 99999 PR PBB SHADOW E&M-EST. PATIENT-LVL III: ICD-10-PCS | Mod: PBBFAC,,, | Performed by: FAMILY MEDICINE

## 2019-03-11 PROCEDURE — 99395 PR PREVENTIVE VISIT,EST,18-39: ICD-10-PCS | Mod: 25,S$GLB,, | Performed by: FAMILY MEDICINE

## 2019-03-11 PROCEDURE — 90632 HEPA VACCINE ADULT IM: CPT | Mod: S$GLB,,, | Performed by: FAMILY MEDICINE

## 2019-03-11 RX ORDER — SERTRALINE HYDROCHLORIDE 50 MG/1
TABLET, FILM COATED ORAL
Qty: 90 TABLET | Refills: 3 | Status: SHIPPED | OUTPATIENT
Start: 2019-03-11 | End: 2020-05-19

## 2019-03-11 NOTE — PROGRESS NOTES
Subjective:       Patient ID: Jae Nassar is a 28 y.o. male.    Chief Complaint: Medication Management (anxiety) and Immunizations (going on cruise)    27 yo presents today for routine exam and immunizations update.  Pt needs to start Hep A vaccine, needs PPD, review of prior records show no meningitis vaccine (recommended by work)      Review of Systems   Constitutional: Negative for activity change, appetite change, chills, fatigue, fever and unexpected weight change.   HENT: Negative for congestion, ear discharge, ear pain, hearing loss and sinus pressure.    Eyes: Negative for pain and visual disturbance.   Respiratory: Negative for cough, chest tightness and shortness of breath.    Cardiovascular: Negative for chest pain, palpitations and leg swelling.   Gastrointestinal: Negative for abdominal distention and abdominal pain.   Genitourinary: Negative for difficulty urinating, dysuria, frequency and hematuria.   Musculoskeletal: Negative for arthralgias, back pain, gait problem, joint swelling, myalgias, neck pain and neck stiffness.   Skin: Negative for rash.   Neurological: Negative for dizziness, tremors, speech difficulty, weakness, numbness and headaches.   Psychiatric/Behavioral: Negative for agitation and behavioral problems. The patient is nervous/anxious.         Prior history of anxiety disorder, doing well on present dose of Zoloft       Objective:      Physical Exam   Constitutional: He is oriented to person, place, and time. He appears well-developed and well-nourished.   HENT:   Head: Normocephalic.   Right Ear: Tympanic membrane, external ear and ear canal normal.   Left Ear: Tympanic membrane, external ear and ear canal normal.   Nose: Nose normal.   Mouth/Throat: Uvula is midline, oropharynx is clear and moist and mucous membranes are normal. No posterior oropharyngeal erythema.   Eyes: Conjunctivae and EOM are normal. Pupils are equal, round, and reactive to light.   Neck: Normal  range of motion. Neck supple. No JVD present. Carotid bruit is not present. No thyroid mass and no thyromegaly present.   Cardiovascular: Normal rate, regular rhythm and normal heart sounds.   No murmur heard.  Pulmonary/Chest: Effort normal and breath sounds normal. He has no rales.   Abdominal: Soft. Bowel sounds are normal. He exhibits no mass. There is no tenderness.   Musculoskeletal: Normal range of motion. He exhibits no edema.   Lymphadenopathy:     He has no cervical adenopathy.   Neurological: He is alert and oriented to person, place, and time. He has normal reflexes. No cranial nerve deficit.   Skin: Skin is warm. No lesion and no rash noted.   Psychiatric: He has a normal mood and affect. His speech is normal and behavior is normal. Judgment and thought content normal. His mood appears not anxious. Cognition and memory are normal. He does not exhibit a depressed mood.       Assessment:       1.  Physical exam  2.  Anxiety disorder  3.  Immunization update   Plan:       1.  Fasting labs, varicella titers  2.  PPD  3.  Hep A, Meningitis vaccine

## 2019-03-11 NOTE — PROGRESS NOTES
Two patient identifiers used, allergies reviewed, vaccines confirmed.  Menactra vaccine administered IM right deltoid.  Hepatatis A #1 administered IM left deltoid.  Pt advised must return 6 months for Hep A #2.  PPD placed left forearm.  Pt advised must return 2 days for result read.  Nurse appt scheduled 3/13/19.

## 2019-03-12 ENCOUNTER — TELEPHONE (OUTPATIENT)
Dept: FAMILY MEDICINE | Facility: CLINIC | Age: 29
End: 2019-03-12

## 2019-03-12 NOTE — TELEPHONE ENCOUNTER
PPD read rescheduled for 03/14/19.     Patient is in need of a letter stating that he had the 1st Hep A vaccine, and is unable have the 2nd Hep A vaccine prior to leaving. 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

## 2019-03-12 NOTE — TELEPHONE ENCOUNTER
----- Message from Jessica Dunn sent at 3/12/2019  3:21 PM CDT -----  Contact: Patient [phone]  He has an appointment tommorow 3/13/19 at 9:40 to get the TB test read but, he wants to know if he can come in on the 14th.    Thank you

## 2019-03-14 ENCOUNTER — CLINICAL SUPPORT (OUTPATIENT)
Dept: FAMILY MEDICINE | Facility: CLINIC | Age: 29
End: 2019-03-14
Payer: COMMERCIAL

## 2019-03-14 ENCOUNTER — TELEPHONE (OUTPATIENT)
Dept: FAMILY MEDICINE | Facility: CLINIC | Age: 29
End: 2019-03-14

## 2019-03-14 DIAGNOSIS — Z11.1 SCREENING FOR TUBERCULOSIS: Primary | ICD-10-CM

## 2019-03-14 DIAGNOSIS — Z23 IMMUNIZATION DUE: Primary | ICD-10-CM

## 2019-03-14 LAB
TB INDURATION - 48 HR READ: NORMAL MM
TB INDURATION - 72 HR READ: NORMAL MM
TB SKIN TEST - 48 HR READ: NORMAL
TB SKIN TEST - 72 HR READ: NEGATIVE
VARICELLA INTERPRETATION: NEGATIVE
VARICELLA ZOSTER IGG: 0.15 ISR

## 2019-03-14 NOTE — PROGRESS NOTES
Two patient identifiers used, site confirmed.  Negative PPD 0 mm induration observed and recorded.  Pt provided with copy of result.

## 2019-03-15 ENCOUNTER — CLINICAL SUPPORT (OUTPATIENT)
Dept: FAMILY MEDICINE | Facility: CLINIC | Age: 29
End: 2019-03-15
Payer: COMMERCIAL

## 2019-03-15 PROCEDURE — 90716 VAR VACCINE LIVE SUBQ: CPT | Mod: S$GLB,,, | Performed by: FAMILY MEDICINE

## 2019-03-15 PROCEDURE — 90716 VARICELLA VACCINE SQ: ICD-10-PCS | Mod: S$GLB,,, | Performed by: FAMILY MEDICINE

## 2019-03-15 PROCEDURE — 90471 IMMUNIZATION ADMIN: CPT | Mod: S$GLB,,, | Performed by: FAMILY MEDICINE

## 2019-03-15 PROCEDURE — 90471 VARICELLA VACCINE SQ: ICD-10-PCS | Mod: S$GLB,,, | Performed by: FAMILY MEDICINE

## 2019-03-15 NOTE — PROGRESS NOTES
Two patient identifiers verified.  Allergies reviewed.  Varicella vaccine administered to Left deltoid as per MD order advise patient to wait 15min after shot is given for any adverse reaction.

## 2019-04-17 ENCOUNTER — TELEPHONE (OUTPATIENT)
Dept: FAMILY MEDICINE | Facility: CLINIC | Age: 29
End: 2019-04-17

## 2019-04-17 NOTE — TELEPHONE ENCOUNTER
Patient advised that Dr. Kaufman is out of clinic until Monday & would not be able to sign a letter for  until then. Patient advised that the letter can wait until Monday if Dr. Kaufman is willing to write it. Please see not below.

## 2019-04-17 NOTE — TELEPHONE ENCOUNTER
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 Please call when letter is ready for .

## 2019-04-25 ENCOUNTER — TELEPHONE (OUTPATIENT)
Dept: FAMILY MEDICINE | Facility: CLINIC | Age: 29
End: 2019-04-25

## 2019-04-25 NOTE — TELEPHONE ENCOUNTER
I just wrote the letter.  See if you can get another physician to sign it in my absence or he can wait until Monday when I return

## 2019-04-25 NOTE — TELEPHONE ENCOUNTER
----- Message from Shivani Ellis sent at 4/25/2019  1:04 PM CDT -----  Contact: Self Call [phone]  Checking the status of a letter he requested about his anxieties .

## 2019-06-18 ENCOUNTER — CLINICAL SUPPORT (OUTPATIENT)
Dept: PRIMARY CARE CLINIC | Facility: CLINIC | Age: 29
End: 2019-06-18
Payer: COMMERCIAL

## 2019-06-18 PROCEDURE — 99999 PR PBB SHADOW E&M-EST. PATIENT-LVL I: CPT | Mod: PBBFAC,,,

## 2019-06-18 PROCEDURE — 90471 VARICELLA VACCINE SQ: ICD-10-PCS | Mod: S$GLB,,, | Performed by: FAMILY MEDICINE

## 2019-06-18 PROCEDURE — 90471 IMMUNIZATION ADMIN: CPT | Mod: S$GLB,,, | Performed by: FAMILY MEDICINE

## 2019-06-18 PROCEDURE — 90716 VARICELLA VACCINE SQ: ICD-10-PCS | Mod: S$GLB,,, | Performed by: FAMILY MEDICINE

## 2019-06-18 PROCEDURE — 90716 VAR VACCINE LIVE SUBQ: CPT | Mod: S$GLB,,, | Performed by: FAMILY MEDICINE

## 2019-06-18 PROCEDURE — 99999 PR PBB SHADOW E&M-EST. PATIENT-LVL I: ICD-10-PCS | Mod: PBBFAC,,,

## 2019-06-18 NOTE — PROGRESS NOTES
Patient here for #2 Varicella.Two patient identifiers verified.  Allergies reviewed.  Varicella given SUBQ to left arm per MD order.  Patient tolerated injection well; no redness, bleeding, or bruising noted to injection site.  Patient instructed to remain in clinic setting for 15 minutes.  Verbalizes understanding.

## 2019-06-28 ENCOUNTER — TELEPHONE (OUTPATIENT)
Dept: PRIMARY CARE CLINIC | Facility: CLINIC | Age: 29
End: 2019-06-28

## 2019-06-28 NOTE — TELEPHONE ENCOUNTER
Spoke with patient, informed him he has refills on prescription from March 2019.  He states the pharmacy could not find the refill quantity in their system.    I called [facility] to authorize 2 additional refills as previously ordered by Dr. Kaufman.

## 2019-06-28 NOTE — TELEPHONE ENCOUNTER
Spoke with patient, informed him that refill authorization was called into the pharmacy earlier today.  He is at [facility] now to check whether or not they have it ready.

## 2019-06-28 NOTE — TELEPHONE ENCOUNTER
----- Message from Oumou Mejia sent at 6/28/2019  4:31 PM CDT -----  Contact: Pt self Mobile/Home [phone]   Patient is calling for an RX refill or new RX.  Is this a refill or new RX:  Refill  RX name and strength: sertraline (ZOLOFT) 50 MG tablet  Directions (copy/paste from chart):  N/A  Is this a 30 day or 90 day RX:  90  Local pharmacy or mail order pharmacy:  [facility] [address] [place] [address] [address] AT [facility]  Pharmacy name and phone # [facility] Phone# [phone], fax# [phone]  Comments:  Patient said that he is going out of town on Monday.

## 2019-06-28 NOTE — TELEPHONE ENCOUNTER
----- Message from Aubrey Bah sent at 6/28/2019  1:26 PM CDT -----  Contact: self   Patient is calling for an RX refill or new RX.  Is this a refill or new RX:  refill  RX name and strength:sertraline (ZOLOFT) 50 MG tablet 90 tablet    Directions (copy/paste from chart):    Is this a 30 day or 90 day RX:  90  Local pharmacy or mail order pharmacy:  [facility] [address] [place] [address] [address] AT [facility] OF [facility] & [address] [phone] (Phone)  [phone] (Fax)  Pharmacy name and phone # (copy/paste from chart):     Comments:

## 2019-08-30 ENCOUNTER — PATIENT MESSAGE (OUTPATIENT)
Dept: PRIMARY CARE CLINIC | Facility: CLINIC | Age: 29
End: 2019-08-30

## 2019-08-30 NOTE — TELEPHONE ENCOUNTER
Spoke with patient, offered appointments with NP next week or can go to an [facility] urgent care for evaluation.  Patient verbalized understanding and said will probably do that.

## 2019-11-12 ENCOUNTER — PATIENT OUTREACH (OUTPATIENT)
Dept: ADMINISTRATIVE | Facility: OTHER | Age: 29
End: 2019-11-12

## 2019-11-15 ENCOUNTER — OFFICE VISIT (OUTPATIENT)
Dept: PODIATRY | Facility: CLINIC | Age: 29
End: 2019-11-15
Payer: COMMERCIAL

## 2019-11-15 VITALS
BODY MASS INDEX: 24.01 KG/M2 | SYSTOLIC BLOOD PRESSURE: 118 MMHG | WEIGHT: 153 LBS | HEIGHT: 67 IN | DIASTOLIC BLOOD PRESSURE: 73 MMHG | HEART RATE: 67 BPM

## 2019-11-15 DIAGNOSIS — B35.1 DERMATOPHYTOSIS OF NAIL: Primary | ICD-10-CM

## 2019-11-15 PROCEDURE — 99999 PR PBB SHADOW E&M-EST. PATIENT-LVL III: ICD-10-PCS | Mod: PBBFAC,,, | Performed by: PODIATRIST

## 2019-11-15 PROCEDURE — 99204 OFFICE O/P NEW MOD 45 MIN: CPT | Mod: S$GLB,,, | Performed by: PODIATRIST

## 2019-11-15 PROCEDURE — 3008F PR BODY MASS INDEX (BMI) DOCUMENTED: ICD-10-PCS | Mod: CPTII,S$GLB,, | Performed by: PODIATRIST

## 2019-11-15 PROCEDURE — 99999 PR PBB SHADOW E&M-EST. PATIENT-LVL III: CPT | Mod: PBBFAC,,, | Performed by: PODIATRIST

## 2019-11-15 PROCEDURE — 99204 PR OFFICE/OUTPT VISIT, NEW, LEVL IV, 45-59 MIN: ICD-10-PCS | Mod: S$GLB,,, | Performed by: PODIATRIST

## 2019-11-15 PROCEDURE — 3008F BODY MASS INDEX DOCD: CPT | Mod: CPTII,S$GLB,, | Performed by: PODIATRIST

## 2019-11-15 NOTE — PATIENT INSTRUCTIONS
General nail care measures for abnormal nails include:    ? Keeping nails trimmed short    ? Avoiding trauma     ? Avoiding contact irritants     ? Keeping nails dry (avoiding wet work)    ? Avoiding all nail cosmetics

## 2019-11-15 NOTE — PROGRESS NOTES
Chief Complaint   Patient presents with    Nail Problem     Left Great Toenail            HPI:   Jae Nassar is a 29 y.o. male who presents to clinic with complaints of left ?fungal toenail, left hallux present for at least nine years when a crack was noted on the nail.  He has tried various home remedy treatments on the nail this summer and noted nail fell over, grew back and got worse.   Other toenails are normal.         Patient Active Problem List   Diagnosis    Chest pain           Current Outpatient Medications on File Prior to Visit   Medication Sig Dispense Refill    multivitamin capsule Take 1 capsule by mouth once daily. 3x per week      sertraline (ZOLOFT) 50 MG tablet Take one half tablet for first 2 weeks,then one tablet 90 tablet 3    fish oil-omega-3 fatty acids 300-1,000 mg capsule Take by mouth once daily.      magnesium oxide (MAG-OX) 400 mg tablet Take 400 mg by mouth once daily.      pantoprazole (PROTONIX) 20 MG tablet Take 1 tablet (20 mg total) by mouth once daily. 30 tablet 11     No current facility-administered medications on file prior to visit.             Review of patient's allergies indicates:   Allergen Reactions    Pcn [penicillins]            Social History     Socioeconomic History    Marital status:      Spouse name: Not on file    Number of children: Not on file    Years of education: Not on file    Highest education level: Not on file   Occupational History    Not on file   Social Needs    Financial resource strain: Not on file    Food insecurity:     Worry: Not on file     Inability: Not on file    Transportation needs:     Medical: Not on file     Non-medical: Not on file   Tobacco Use    Smoking status: Never Smoker    Smokeless tobacco: Never Used   Substance and Sexual Activity    Alcohol use: No    Drug use: No    Sexual activity: Not on file   Lifestyle    Physical activity:     Days per week: Not on file     Minutes per session:  "Not on file    Stress: Not on file   Relationships    Social connections:     Talks on phone: Not on file     Gets together: Not on file     Attends Jain service: Not on file     Active member of club or organization: Not on file     Attends meetings of clubs or organizations: Not on file     Relationship status: Not on file   Other Topics Concern    Not on file   Social History Narrative    Not on file             ROS:    General ROS: negative for - chills, fatigue or fever  Cardiovascular ROS: no chest pain or dyspnea on exertion  Musculoskeletal ROS: negative for - joint pain or joint stiffness   Skin: Negative for rash, Positive for nail or hair changes.  no itching.       EXAM:   Vitals:    11/15/19 1056   BP: 118/73   Pulse: 67   Weight: 69.4 kg (153 lb)   Height: 5' 7" (1.702 m)        General:  alert, no distress, cooperative    Vasc:    Pedal pulses: DP 2/4 and PT 2/4    Capillary Refill Time: Normal   Temperature: Normal   Hair Growth: Normal - B/L  Edema:  Absent bilaterally      Neuro Motor:   [place] present bilaterally,   Reflexes normal bilaterally,   Tinels absent  Mulders absent      Derm:   Yellow mycotic appearing left hallux toenail with subungual debris   No paronychia  No drainage  No open wounds  No bruising.       Msk:    tenderness absent bilaterally, masses absent bilaterally, range of movement non-painful and equal, crepitation absent bilaterally, strength normal bilaterally and gait normal          ASSESSMENT / PLAN:     Problem List Items Addressed This Visit     None      Visit Diagnoses     Dermatophytosis of nail    -  Primary    Relevant Medications    efinaconazole (JUBLIA) 10 % Ethan            · I counseled the patient on the patient's conditions, their implications and medical management.   · Trim nails.   · Avoid foot soaks.   · Meds as ordered  General nail care measures for abnormal nails include:    ? Keeping nails trimmed short    ? Avoiding trauma     ? Avoiding " contact irritants     ? Keeping nails dry (avoiding wet work)    ? Avoiding all nail cosmetics

## 2019-12-03 ENCOUNTER — TELEPHONE (OUTPATIENT)
Dept: PHARMACY | Facility: CLINIC | Age: 29
End: 2019-12-03

## 2019-12-03 DIAGNOSIS — B35.1 DERMATOPHYTOSIS OF NAIL: Primary | ICD-10-CM

## 2019-12-04 RX ORDER — CICLOPIROX 80 MG/ML
SOLUTION TOPICAL NIGHTLY
Qty: 6.6 ML | Refills: 5 | Status: SHIPPED | OUTPATIENT
Start: 2019-12-04 | End: 2021-03-11 | Stop reason: SDUPTHER

## 2019-12-12 ENCOUNTER — PATIENT MESSAGE (OUTPATIENT)
Dept: PODIATRY | Facility: CLINIC | Age: 29
End: 2019-12-12

## 2020-01-11 ENCOUNTER — PATIENT MESSAGE (OUTPATIENT)
Dept: PRIMARY CARE CLINIC | Facility: CLINIC | Age: 30
End: 2020-01-11

## 2020-02-12 ENCOUNTER — PATIENT MESSAGE (OUTPATIENT)
Dept: PRIMARY CARE CLINIC | Facility: CLINIC | Age: 30
End: 2020-02-12

## 2020-02-12 ENCOUNTER — TELEPHONE (OUTPATIENT)
Dept: PRIMARY CARE CLINIC | Facility: CLINIC | Age: 30
End: 2020-02-12

## 2020-02-14 ENCOUNTER — CLINICAL SUPPORT (OUTPATIENT)
Dept: PRIMARY CARE CLINIC | Facility: CLINIC | Age: 30
End: 2020-02-14
Payer: COMMERCIAL

## 2020-02-14 DIAGNOSIS — Z23 NEED FOR HEPATITIS A IMMUNIZATION: ICD-10-CM

## 2020-02-14 DIAGNOSIS — Z23 NEED FOR INFLUENZA VACCINATION: Primary | ICD-10-CM

## 2020-02-14 PROCEDURE — 90686 IIV4 VACC NO PRSV 0.5 ML IM: CPT | Mod: S$GLB,,, | Performed by: FAMILY MEDICINE

## 2020-02-14 PROCEDURE — 90471 FLU VACCINE (QUAD) GREATER THAN OR EQUAL TO 3YO PRESERVATIVE FREE IM: ICD-10-PCS | Mod: S$GLB,,, | Performed by: FAMILY MEDICINE

## 2020-02-14 PROCEDURE — 90471 IMMUNIZATION ADMIN: CPT | Mod: S$GLB,,, | Performed by: FAMILY MEDICINE

## 2020-02-14 PROCEDURE — 90632 HEPATITIS A VACCINE ADULT IM: ICD-10-PCS | Mod: S$GLB,,, | Performed by: FAMILY MEDICINE

## 2020-02-14 PROCEDURE — 90472 HEPATITIS A VACCINE ADULT IM: ICD-10-PCS | Mod: S$GLB,,, | Performed by: FAMILY MEDICINE

## 2020-02-14 PROCEDURE — 90472 IMMUNIZATION ADMIN EACH ADD: CPT | Mod: S$GLB,,, | Performed by: FAMILY MEDICINE

## 2020-02-14 PROCEDURE — 90632 HEPA VACCINE ADULT IM: CPT | Mod: S$GLB,,, | Performed by: FAMILY MEDICINE

## 2020-02-14 PROCEDURE — 90686 FLU VACCINE (QUAD) GREATER THAN OR EQUAL TO 3YO PRESERVATIVE FREE IM: ICD-10-PCS | Mod: S$GLB,,, | Performed by: FAMILY MEDICINE

## 2020-02-14 NOTE — PROGRESS NOTES
Two patient identifiers verified.  Allergies reviewed. Hep A #2  IM administered to left deltoid per MD order.  Patient tolerated injection well; no redness, bleeding, or bruising noted to injection site.  Patient instructed to remain in clinic setting for 15 minutes.  Verbalizes understanding.    Two patient identifiers verified.  Allergies reviewed.  Flu 0.5 IM administered to right deltoid per MD order.  Patient tolerated injection well; no redness, bleeding, or bruising noted to injection site.  Patient instructed to remain in clinic setting for 15 minutes.  Verbalizes understanding.

## 2020-05-18 ENCOUNTER — PATIENT MESSAGE (OUTPATIENT)
Dept: PRIMARY CARE CLINIC | Facility: CLINIC | Age: 30
End: 2020-05-18

## 2020-05-19 ENCOUNTER — OFFICE VISIT (OUTPATIENT)
Dept: PRIMARY CARE CLINIC | Facility: CLINIC | Age: 30
End: 2020-05-19
Payer: COMMERCIAL

## 2020-05-19 DIAGNOSIS — Z09 FOLLOW UP: Primary | ICD-10-CM

## 2020-05-19 PROCEDURE — 99213 OFFICE O/P EST LOW 20 MIN: CPT | Mod: 95,,, | Performed by: FAMILY MEDICINE

## 2020-05-19 PROCEDURE — 99213 PR OFFICE/OUTPT VISIT, EST, LEVL III, 20-29 MIN: ICD-10-PCS | Mod: 95,,, | Performed by: FAMILY MEDICINE

## 2020-05-19 NOTE — PROGRESS NOTES
Subjective:       Patient ID: Jae Nassar is a 29 y.o. male.    Chief Complaint: No chief complaint on file.    The patient location is: home  The chief complaint leading to consultation is: follow up anxiety    Visit type: audiovisual    Face to Face time with patient: 15   minutes of total time spent on the encounter, which includes face to face time and non-face to face time preparing to see the patient (eg, review of tests), Obtaining and/or reviewing separately obtained history, Documenting clinical information in the electronic or other health record, Independently interpreting results (not separately reported) and communicating results to the patient/family/caregiver, or Care coordination (not separately reported).         Each patient to whom he or she provides medical services by telemedicine is:  (1) informed of the relationship between the physician and patient and the respective role of any other health care provider with respect to management of the patient; and (2) notified that he or she may decline to receive medical services by telemedicine and may withdraw from such care at any time.    Notes: 30 yo presents today for follow up of prior diagnosis of anxiety disorder.  He had done well while on Sertraline.  He has been symptom free for over a year and was able to wean off the medication over one year ago.  He is currently doing well without any anxiety or depressive symptoms    Review of Systems   Constitutional: Negative for activity change and unexpected weight change.   HENT: Negative for hearing loss, rhinorrhea and trouble swallowing.    Eyes: Negative for discharge and visual disturbance.   Respiratory: Negative for chest tightness and wheezing.    Cardiovascular: Negative for chest pain and palpitations.   Gastrointestinal: Negative for blood in stool, constipation, diarrhea and vomiting.   Endocrine: Negative for polydipsia and polyuria.   Genitourinary: Negative for difficulty  urinating, hematuria and urgency.   Musculoskeletal: Negative for arthralgias, joint swelling and neck pain.   Neurological: Negative for weakness and headaches.   Psychiatric/Behavioral: Negative for agitation, behavioral problems, confusion, decreased concentration, dysphoric mood, self-injury and sleep disturbance. The patient is not nervous/anxious and is not hyperactive.        Objective:      Physical Exam   Constitutional: He appears well-developed and well-nourished. No distress.   Eyes: Pupils are equal, round, and reactive to light. EOM are normal.   Psychiatric: He has a normal mood and affect. His behavior is normal. Judgment and thought content normal. His mood appears not anxious. Thought content is not paranoid and not delusional. Cognition and memory are normal. Cognition and memory are not impaired. He does not exhibit a depressed mood. He expresses no suicidal ideation. He exhibits normal recent memory and normal remote memory.       Assessment:       Prior history of anxiety, resolved  Plan:       1. Call in future if any problems

## 2020-05-19 NOTE — PROGRESS NOTES
Subjective:       Patient ID: Jae Nassar is a 29 y.o. male.    Chief Complaint: No chief complaint on file.    The patient location is: home  The chief complaint leading to consultation is: follow up, anxiety disorder    Visit type: {TELE AUDIOVISUAL:52517}    Face to Face time with patient: 15 minutes of total time spent on the encounter, which includes face to face time and non-face to face time preparing to see the patient (eg, review of tests), Obtaining and/or reviewing separately obtained history, Documenting clinical information in the electronic or other health record, Independently interpreting results (not separately reported) and communicating results to the patient/family/caregiver, or Care coordination (not separately reported).         Each patient to whom he or she provides medical services by telemedicine is:  (1) informed of the relationship between the physician and patient and the respective role of any other health care provider with respect to management of the patient; and (2) notified that he or she may decline to receive medical services by telemedicine and may withdraw from such care at any time.    Notes: 28 yo presents today for follow up.  Previously seen for history of anxiety, controlled on Sertraline.  He is now off the medication for over a year, doing very well and enjoying present work.  He denies any anxiety symptoms or depression    Review of Systems   Constitutional: Negative for activity change, fatigue and unexpected weight change.   HENT: Negative for hearing loss, rhinorrhea and trouble swallowing.    Eyes: Negative for discharge and visual disturbance.   Respiratory: Negative for chest tightness and wheezing.    Cardiovascular: Negative for chest pain and palpitations.   Gastrointestinal: Negative for blood in stool, constipation, diarrhea and vomiting.   Endocrine: Negative for polydipsia and polyuria.   Genitourinary: Negative for difficulty urinating, hematuria  and urgency.   Musculoskeletal: Negative for arthralgias, joint swelling and neck pain.   Neurological: Negative for weakness and headaches.   Psychiatric/Behavioral: Negative for agitation, behavioral problems, confusion, decreased concentration, dysphoric mood, self-injury and sleep disturbance. The patient is not nervous/anxious and is not hyperactive.        Objective:      Physical Exam   Constitutional: He appears well-developed and well-nourished. No distress.   Eyes: Pupils are equal, round, and reactive to light. EOM are normal.   Psychiatric: He has a normal mood and affect. His speech is normal and behavior is normal. Judgment and thought content normal. His mood appears not anxious. His affect is not labile. Cognition and memory are normal. Cognition and memory are not impaired. He does not exhibit a depressed mood. He expresses no homicidal and no suicidal ideation. He exhibits normal recent memory and normal remote memory.       Assessment:        history of anxiety, resolved  Plan:       Pt knows to follow up as needed

## 2020-08-27 ENCOUNTER — LAB VISIT (OUTPATIENT)
Dept: PRIMARY CARE CLINIC | Facility: OTHER | Age: 30
End: 2020-08-27
Attending: INTERNAL MEDICINE
Payer: COMMERCIAL

## 2020-08-27 DIAGNOSIS — R51.9 HEAD ACHE: ICD-10-CM

## 2020-08-27 PROCEDURE — U0003 INFECTIOUS AGENT DETECTION BY NUCLEIC ACID (DNA OR RNA); SEVERE ACUTE RESPIRATORY SYNDROME CORONAVIRUS 2 (SARS-COV-2) (CORONAVIRUS DISEASE [COVID-19]), AMPLIFIED PROBE TECHNIQUE, MAKING USE OF HIGH THROUGHPUT TECHNOLOGIES AS DESCRIBED BY CMS-2020-01-R: HCPCS

## 2020-08-28 LAB — SARS-COV-2 RNA RESP QL NAA+PROBE: NOT DETECTED

## 2020-10-01 ENCOUNTER — PATIENT MESSAGE (OUTPATIENT)
Dept: ADMINISTRATIVE | Facility: OTHER | Age: 30
End: 2020-10-01

## 2020-10-01 ENCOUNTER — PATIENT MESSAGE (OUTPATIENT)
Dept: PRIMARY CARE CLINIC | Facility: CLINIC | Age: 30
End: 2020-10-01

## 2020-11-05 ENCOUNTER — OFFICE VISIT (OUTPATIENT)
Dept: URGENT CARE | Facility: CLINIC | Age: 30
End: 2020-11-05
Payer: COMMERCIAL

## 2020-11-05 VITALS
WEIGHT: 153 LBS | HEART RATE: 96 BPM | TEMPERATURE: 99 F | BODY MASS INDEX: 24.01 KG/M2 | OXYGEN SATURATION: 100 % | SYSTOLIC BLOOD PRESSURE: 135 MMHG | HEIGHT: 67 IN | DIASTOLIC BLOOD PRESSURE: 90 MMHG

## 2020-11-05 DIAGNOSIS — J06.9 VIRAL URI: Primary | ICD-10-CM

## 2020-11-05 DIAGNOSIS — R50.9 FEVER, UNSPECIFIED FEVER CAUSE: ICD-10-CM

## 2020-11-05 LAB
CTP QC/QA: YES
SARS-COV-2 RDRP RESP QL NAA+PROBE: NEGATIVE

## 2020-11-05 PROCEDURE — U0002: ICD-10-PCS | Mod: QW,S$GLB,, | Performed by: NURSE PRACTITIONER

## 2020-11-05 PROCEDURE — 99214 OFFICE O/P EST MOD 30 MIN: CPT | Mod: S$GLB,,, | Performed by: NURSE PRACTITIONER

## 2020-11-05 PROCEDURE — U0002 COVID-19 LAB TEST NON-CDC: HCPCS | Mod: QW,S$GLB,, | Performed by: NURSE PRACTITIONER

## 2020-11-05 PROCEDURE — 99214 PR OFFICE/OUTPT VISIT, EST, LEVL IV, 30-39 MIN: ICD-10-PCS | Mod: S$GLB,,, | Performed by: NURSE PRACTITIONER

## 2020-11-05 NOTE — PATIENT INSTRUCTIONS
Viral Upper Respiratory Illness (Adult)  You have a viral upper respiratory illness (URI), which is another term for the common cold. This illness is contagious during the first few days. It is spread through the air by coughing and sneezing. It may also be spread by direct contact (touching the sick person and then touching your own eyes, nose, or mouth). Frequent handwashing will decrease risk of spread. Most viral illnesses go away within 7 to 10 days with rest and simple home remedies. Sometimes the illness may last for several weeks. Antibiotics will not kill a virus, and they are generally not prescribed for this condition.    Home care  · If symptoms are severe, rest at home for the first 2 to 3 days. When you resume activity, don't let yourself get too tired.  · Avoid being exposed to cigarette smoke (yours or others).  · You may use acetaminophen or ibuprofen to control pain and fever, unless another medicine was prescribed. (Note: If you have chronic liver or kidney disease, have ever had a stomach ulcer or gastrointestinal bleeding, or are taking blood-thinning medicines, talk with your healthcare provider before using these medicines.) Aspirin should never be given to anyone under 18 years of age who is ill with a viral infection or fever. It may cause severe liver or brain damage.  · Your appetite may be poor, so a light diet is fine. Avoid dehydration by drinking 6 to 8 glasses of fluids per day (water, soft drinks, juices, tea, or soup). Extra fluids will help loosen secretions in the nose and lungs.  · Over-the-counter cold medicines will not shorten the length of time youre sick, but they may be helpful for the following symptoms: cough, sore throat, and nasal and sinus congestion. (Note: Do not use decongestants if you have high blood pressure.)  Follow-up care  Follow up with your healthcare provider, or as advised.  When to seek medical advice  Call your healthcare provider right away if any  of these occur:  · Cough with lots of colored sputum (mucus)  · Severe headache; face, neck, or ear pain  · Difficulty swallowing due to throat pain  · Fever of 100.4°F (38°C)  Call 911, or get immediate medical care  Call emergency services right away if any of these occur:  · Chest pain, shortness of breath, wheezing, or difficulty breathing  · Coughing up blood  · Inability to swallow due to throat pain  Date Last Reviewed: 9/13/2015  © 9522-3687 [employer]. [address] [place] [id number]. All rights reserved. This information is not intended as a substitute for professional medical care. Always follow your healthcare professional's instructions.

## 2020-11-05 NOTE — PROGRESS NOTES
"Subjective:       Patient ID: Jae Nassar is a 30 y.o. male.    Vitals:  height is 5' 7" (1.702 m) and weight is 69.4 kg (153 lb). His temperature is 98.5 °F (36.9 °C). His blood pressure is 135/90 (abnormal) and his pulse is 96. His oxygen saturation is 100%.     Chief Complaint: URI    Pt states for 2 -3 days having post nasal drip, nasal congestion , dry cough with a scratchy throat, low grade temp, fatigue. No chills, CP, SOB, abd pain, n/v/d, anosmia. No known covid exposure.     URI   This is a new problem. The current episode started in the past 7 days. The problem has been gradually worsening. Maximum temperature: 99.0. Associated symptoms include congestion, coughing and sneezing. Pertinent negatives include no ear pain, nausea, rash, sinus pain, sore throat, vomiting or wheezing. He has tried nothing for the symptoms.       Constitution: Positive for fever. Negative for sweating and fatigue.   HENT: Positive for congestion and postnasal drip. Negative for ear pain, sinus pain, sinus pressure, sore throat and voice change.    Neck: Negative for painful lymph nodes.   Eyes: Negative for eye redness.   Respiratory: Positive for cough. Negative for chest tightness, sputum production, bloody sputum, COPD, shortness of breath, stridor, wheezing and asthma.    Gastrointestinal: Negative for nausea and vomiting.   Musculoskeletal: Negative for muscle ache.   Skin: Negative for rash.   Allergic/Immunologic: Positive for sneezing. Negative for seasonal allergies and asthma.   Hematologic/Lymphatic: Negative for swollen lymph nodes.       Objective:      Physical Exam   Constitutional: He is oriented to person, place, and time. He appears well-developed. He is cooperative.  Non-toxic appearance. He does not appear ill. No distress.   HENT:   Head: Normocephalic and atraumatic.   Ears:   Right Ear: Hearing, tympanic membrane, external ear and ear canal normal.   Left Ear: Hearing, tympanic membrane, external " ear and ear canal normal.   Nose: Nose normal. No mucosal edema, rhinorrhea or nasal deformity. No epistaxis. Right sinus exhibits no maxillary sinus tenderness and no frontal sinus tenderness. Left sinus exhibits no maxillary sinus tenderness and no frontal sinus tenderness.   Mouth/Throat: Uvula is midline, oropharynx is clear and moist and mucous membranes are normal. Mucous membranes are moist. No trismus in the jaw. Normal dentition. No uvula swelling. No oropharyngeal exudate, posterior oropharyngeal edema or posterior oropharyngeal erythema. Oropharynx is clear.   Eyes: Pupils are equal, round, and reactive to light. Conjunctivae and lids are normal. No scleral icterus.   Neck: Trachea normal, normal range of motion, full passive range of motion without pain and phonation normal. Neck supple. No neck rigidity. No edema and no erythema present.   Cardiovascular: Normal rate, regular rhythm, normal heart sounds and normal pulses.   Pulmonary/Chest: Effort normal and breath sounds normal. No respiratory distress. He has no decreased breath sounds. He has no wheezes. He has no rhonchi. He has no rales.   Abdominal: Normal appearance.   Musculoskeletal: Normal range of motion.         General: No deformity.   Lymphadenopathy:     He has no cervical adenopathy.   Neurological: He is alert and oriented to person, place, and time. He exhibits normal muscle tone. Coordination normal.   Skin: Skin is warm, dry, intact, not diaphoretic and not pale. Psychiatric: His speech is normal and behavior is normal. Judgment and thought content normal.   Nursing note and vitals reviewed.    Results for orders placed or performed in visit on 11/05/20   POCT COVID-19 Rapid Screening   Result Value Ref Range    POC Rapid COVID Negative Negative     Acceptable Yes            Assessment:       1. Viral URI    2. Fever, unspecified fever cause        Plan:         Viral URI    Fever, unspecified fever cause  -     POCT  COVID-19 Rapid Screening         Reviewed previous pertinent office visits, PMH, PSH, fam hx  We discussed that this is likely a viral illness and will not benefit from antibiotics. Symptomatic care recommended.   Recommended otc motrin or tylenol as needed for fever/aches unless contraindicated  Advised on return/follow-up precautions. Advised on ER precautions. Answered all patient questions. Patient verbalized understanding and voiced agreement with current treatment plan.    Patient Instructions     Viral Upper Respiratory Illness (Adult)  You have a viral upper respiratory illness (URI), which is another term for the common cold. This illness is contagious during the first few days. It is spread through the air by coughing and sneezing. It may also be spread by direct contact (touching the sick person and then touching your own eyes, nose, or mouth). Frequent handwashing will decrease risk of spread. Most viral illnesses go away within 7 to 10 days with rest and simple home remedies. Sometimes the illness may last for several weeks. Antibiotics will not kill a virus, and they are generally not prescribed for this condition.    Home care  · If symptoms are severe, rest at home for the first 2 to 3 days. When you resume activity, don't let yourself get too tired.  · Avoid being exposed to cigarette smoke (yours or others).  · You may use acetaminophen or ibuprofen to control pain and fever, unless another medicine was prescribed. (Note: If you have chronic liver or kidney disease, have ever had a stomach ulcer or gastrointestinal bleeding, or are taking blood-thinning medicines, talk with your healthcare provider before using these medicines.) Aspirin should never be given to anyone under 18 years of age who is ill with a viral infection or fever. It may cause severe liver or brain damage.  · Your appetite may be poor, so a light diet is fine. Avoid dehydration by drinking 6 to 8 glasses of fluids per day  (water, soft drinks, juices, tea, or soup). Extra fluids will help loosen secretions in the nose and lungs.  · Over-the-counter cold medicines will not shorten the length of time youre sick, but they may be helpful for the following symptoms: cough, sore throat, and nasal and sinus congestion. (Note: Do not use decongestants if you have high blood pressure.)  Follow-up care  Follow up with your healthcare provider, or as advised.  When to seek medical advice  Call your healthcare provider right away if any of these occur:  · Cough with lots of colored sputum (mucus)  · Severe headache; face, neck, or ear pain  · Difficulty swallowing due to throat pain  · Fever of 100.4°F (38°C)  Call 911, or get immediate medical care  Call emergency services right away if any of these occur:  · Chest pain, shortness of breath, wheezing, or difficulty breathing  · Coughing up blood  · Inability to swallow due to throat pain  Date Last Reviewed: 9/13/2015  © 5426-6773 [employer]. [address] [place] [address], [place] [address]. All rights reserved. This information is not intended as a substitute for professional medical care. Always follow your healthcare professional's instructions.

## 2021-02-10 DIAGNOSIS — B35.1 DERMATOPHYTOSIS OF NAIL: ICD-10-CM

## 2021-02-10 RX ORDER — CICLOPIROX 80 MG/ML
SOLUTION TOPICAL NIGHTLY
Qty: 6.6 ML | Refills: 5 | OUTPATIENT
Start: 2021-02-10

## 2021-03-06 ENCOUNTER — IMMUNIZATION (OUTPATIENT)
Dept: PRIMARY CARE CLINIC | Facility: CLINIC | Age: 31
End: 2021-03-06
Payer: COMMERCIAL

## 2021-03-06 DIAGNOSIS — Z23 NEED FOR VACCINATION: Primary | ICD-10-CM

## 2021-03-06 PROCEDURE — 0001A PR IMMUNIZ ADMIN, SARS-COV-2 COVID-19 VACC, 30MCG/0.3ML, 1ST DOSE: CPT | Mod: CV19,S$GLB,, | Performed by: INTERNAL MEDICINE

## 2021-03-06 PROCEDURE — 91300 PR SARS-COV- 2 COVID-19 VACCINE, NO PRSV, 30MCG/0.3ML, IM: ICD-10-PCS | Mod: S$GLB,,, | Performed by: INTERNAL MEDICINE

## 2021-03-06 PROCEDURE — 0001A PR IMMUNIZ ADMIN, SARS-COV-2 COVID-19 VACC, 30MCG/0.3ML, 1ST DOSE: ICD-10-PCS | Mod: CV19,S$GLB,, | Performed by: INTERNAL MEDICINE

## 2021-03-06 PROCEDURE — 91300 PR SARS-COV- 2 COVID-19 VACCINE, NO PRSV, 30MCG/0.3ML, IM: CPT | Mod: S$GLB,,, | Performed by: INTERNAL MEDICINE

## 2021-03-06 RX ADMIN — Medication 0.3 ML: at 02:03

## 2021-03-11 ENCOUNTER — PATIENT MESSAGE (OUTPATIENT)
Dept: PODIATRY | Facility: CLINIC | Age: 31
End: 2021-03-11

## 2021-03-11 DIAGNOSIS — B35.1 DERMATOPHYTOSIS OF NAIL: ICD-10-CM

## 2021-03-11 RX ORDER — CICLOPIROX 80 MG/ML
SOLUTION TOPICAL NIGHTLY
Qty: 6.6 ML | Refills: 5 | Status: SHIPPED | OUTPATIENT
Start: 2021-03-11 | End: 2022-01-06 | Stop reason: ALTCHOICE

## 2021-03-27 ENCOUNTER — IMMUNIZATION (OUTPATIENT)
Dept: PRIMARY CARE CLINIC | Facility: CLINIC | Age: 31
End: 2021-03-27
Payer: COMMERCIAL

## 2021-03-27 DIAGNOSIS — Z23 NEED FOR VACCINATION: Primary | ICD-10-CM

## 2021-03-27 PROCEDURE — 0002A PR IMMUNIZ ADMIN, SARS-COV-2 COVID-19 VACC, 30MCG/0.3ML, 2ND DOSE: ICD-10-PCS | Mod: CV19,S$GLB,, | Performed by: INTERNAL MEDICINE

## 2021-03-27 PROCEDURE — 91300 PR SARS-COV- 2 COVID-19 VACCINE, NO PRSV, 30MCG/0.3ML, IM: ICD-10-PCS | Mod: S$GLB,,, | Performed by: INTERNAL MEDICINE

## 2021-03-27 PROCEDURE — 0002A PR IMMUNIZ ADMIN, SARS-COV-2 COVID-19 VACC, 30MCG/0.3ML, 2ND DOSE: CPT | Mod: CV19,S$GLB,, | Performed by: INTERNAL MEDICINE

## 2021-03-27 PROCEDURE — 91300 PR SARS-COV- 2 COVID-19 VACCINE, NO PRSV, 30MCG/0.3ML, IM: CPT | Mod: S$GLB,,, | Performed by: INTERNAL MEDICINE

## 2021-03-27 RX ADMIN — Medication 0.3 ML: at 02:03

## 2021-04-08 ENCOUNTER — TELEPHONE (OUTPATIENT)
Dept: PRIMARY CARE CLINIC | Facility: CLINIC | Age: 31
End: 2021-04-08

## 2021-04-08 DIAGNOSIS — Z00.00 ROUTINE GENERAL MEDICAL EXAMINATION AT A HEALTH CARE FACILITY: Primary | ICD-10-CM

## 2021-09-30 ENCOUNTER — OCCUPATIONAL HEALTH (OUTPATIENT)
Dept: URGENT CARE | Facility: CLINIC | Age: 31
End: 2021-09-30

## 2021-09-30 DIAGNOSIS — Z00.00 PHYSICAL EXAM: ICD-10-CM

## 2021-09-30 DIAGNOSIS — Z02.1 PHYSICAL EXAM, PRE-EMPLOYMENT: Primary | ICD-10-CM

## 2021-09-30 PROCEDURE — 99499 UNLISTED E&M SERVICE: CPT | Mod: S$GLB,,, | Performed by: EMERGENCY MEDICINE

## 2021-09-30 PROCEDURE — 92552 PURE TONE AUDIOMETRY AIR: CPT | Mod: S$GLB,,, | Performed by: EMERGENCY MEDICINE

## 2021-09-30 PROCEDURE — 92552 PR PURE TONE AUDIOMETRY, AIR: ICD-10-PCS | Mod: S$GLB,,, | Performed by: EMERGENCY MEDICINE

## 2021-09-30 PROCEDURE — 99499 COAST GUARD PHYSICAL: ICD-10-PCS | Mod: S$GLB,,, | Performed by: EMERGENCY MEDICINE

## 2022-01-06 ENCOUNTER — OFFICE VISIT (OUTPATIENT)
Dept: FAMILY MEDICINE | Facility: CLINIC | Age: 32
End: 2022-01-06
Payer: COMMERCIAL

## 2022-01-06 VITALS
HEART RATE: 74 BPM | OXYGEN SATURATION: 100 % | TEMPERATURE: 98 F | SYSTOLIC BLOOD PRESSURE: 137 MMHG | DIASTOLIC BLOOD PRESSURE: 88 MMHG | HEIGHT: 67 IN | BODY MASS INDEX: 23.18 KG/M2 | WEIGHT: 147.69 LBS

## 2022-01-06 DIAGNOSIS — Z00.00 ANNUAL PHYSICAL EXAM: Primary | ICD-10-CM

## 2022-01-06 PROCEDURE — 1159F PR MEDICATION LIST DOCUMENTED IN MEDICAL RECORD: ICD-10-PCS | Mod: CPTII,S$GLB,, | Performed by: FAMILY MEDICINE

## 2022-01-06 PROCEDURE — 3008F BODY MASS INDEX DOCD: CPT | Mod: CPTII,S$GLB,, | Performed by: FAMILY MEDICINE

## 2022-01-06 PROCEDURE — 3079F DIAST BP 80-89 MM HG: CPT | Mod: CPTII,S$GLB,, | Performed by: FAMILY MEDICINE

## 2022-01-06 PROCEDURE — 3008F PR BODY MASS INDEX (BMI) DOCUMENTED: ICD-10-PCS | Mod: CPTII,S$GLB,, | Performed by: FAMILY MEDICINE

## 2022-01-06 PROCEDURE — 99999 PR PBB SHADOW E&M-EST. PATIENT-LVL III: ICD-10-PCS | Mod: PBBFAC,,, | Performed by: FAMILY MEDICINE

## 2022-01-06 PROCEDURE — 99395 PR PREVENTIVE VISIT,EST,18-39: ICD-10-PCS | Mod: S$GLB,,, | Performed by: FAMILY MEDICINE

## 2022-01-06 PROCEDURE — 1159F MED LIST DOCD IN RCRD: CPT | Mod: CPTII,S$GLB,, | Performed by: FAMILY MEDICINE

## 2022-01-06 PROCEDURE — 3075F SYST BP GE 130 - 139MM HG: CPT | Mod: CPTII,S$GLB,, | Performed by: FAMILY MEDICINE

## 2022-01-06 PROCEDURE — 99999 PR PBB SHADOW E&M-EST. PATIENT-LVL III: CPT | Mod: PBBFAC,,, | Performed by: FAMILY MEDICINE

## 2022-01-06 PROCEDURE — 99395 PREV VISIT EST AGE 18-39: CPT | Mod: S$GLB,,, | Performed by: FAMILY MEDICINE

## 2022-01-06 PROCEDURE — 3079F PR MOST RECENT DIASTOLIC BLOOD PRESSURE 80-89 MM HG: ICD-10-PCS | Mod: CPTII,S$GLB,, | Performed by: FAMILY MEDICINE

## 2022-01-06 PROCEDURE — 3075F PR MOST RECENT SYSTOLIC BLOOD PRESS GE 130-139MM HG: ICD-10-PCS | Mod: CPTII,S$GLB,, | Performed by: FAMILY MEDICINE

## 2022-01-06 NOTE — PROGRESS NOTES
"Chief Complaint   Patient presents with    hospitals Care       SUBJECTIVE:   Jae Nassar is a 31 y.o. male presenting for his annual checkup.   Current Outpatient Medications   Medication Sig Dispense Refill    multivitamin capsule Take 1 capsule by mouth once daily. 3x per week       No current facility-administered medications for this visit.     Allergies: Pcn [penicillins]   There are no problems to display for this patient.      ROS:  Feeling well. No dyspnea or chest pain on exertion. No abdominal pain, change in bowel habits, black or bloody stools. No urinary tract or prostatic symptoms. No neurological complaints.    OBJECTIVE:   The patient appears well, alert, oriented x 3, in no distress.   /88   Pulse 74   Temp 97.8 °F (36.6 °C) (Oral)   Ht 5' 7" (1.702 m)   Wt 67 kg (147 lb 11.3 oz)   SpO2 100%   BMI 23.13 kg/m²   Wt Readings from Last 5 Encounters:   01/06/22 67 kg (147 lb 11.3 oz)   11/05/20 69.4 kg (153 lb)   11/15/19 69.4 kg (153 lb)   03/11/19 69.5 kg (153 lb 3.5 oz)   06/04/18 63.5 kg (139 lb 15.9 oz)       ENT normal.  Neck supple. No adenopathy or thyromegaly. PAT. Lungs are clear, good air entry, no wheezes, rhonchi or rales. S1 and S2 normal, no murmurs, regular rate and rhythm. Abdomen is soft without tenderness, guarding, mass or organomegaly.  exam: deferred.  Extremities show no edema, normal peripheral pulses. Neurological is normal without focal findings.    ASSESSMENT:   1. Annual physical exam          PLAN:   Counseled on age appropriate medical preventative services, including age appropriate cancer screenings, over all nutritional health, need for a consistent exercise regimen and an over all push towards maintaining a vigorous and active lifestyle.  Counseled on age appropriate vaccines and discussed upcoming health care needs based on age/gender.  Spent time with patient counseling on need for a good patient/doctor relationship moving forward.  " Discussed use of common OTC medications and supplements.  Discussed common dietary aids and use of caffeine and the need for good sleep hygiene and stress management.    Problem List Items Addressed This Visit    None     Visit Diagnoses     Annual physical exam    -  Primary

## 2022-01-16 ENCOUNTER — PATIENT MESSAGE (OUTPATIENT)
Dept: FAMILY MEDICINE | Facility: CLINIC | Age: 32
End: 2022-01-16
Payer: COMMERCIAL

## 2022-01-16 DIAGNOSIS — K62.89 RECTAL DISCOMFORT: Primary | ICD-10-CM

## 2022-01-19 ENCOUNTER — PATIENT MESSAGE (OUTPATIENT)
Dept: FAMILY MEDICINE | Facility: CLINIC | Age: 32
End: 2022-01-19
Payer: COMMERCIAL

## 2022-04-26 ENCOUNTER — PATIENT MESSAGE (OUTPATIENT)
Dept: FAMILY MEDICINE | Facility: CLINIC | Age: 32
End: 2022-04-26
Payer: COMMERCIAL

## 2022-04-27 ENCOUNTER — OFFICE VISIT (OUTPATIENT)
Dept: FAMILY MEDICINE | Facility: CLINIC | Age: 32
End: 2022-04-27
Payer: COMMERCIAL

## 2022-04-27 DIAGNOSIS — M62.830 MUSCLE SPASM OF BACK: Primary | ICD-10-CM

## 2022-04-27 PROCEDURE — 99214 PR OFFICE/OUTPT VISIT, EST, LEVL IV, 30-39 MIN: ICD-10-PCS | Mod: 95,,, | Performed by: NURSE PRACTITIONER

## 2022-04-27 PROCEDURE — 1160F PR REVIEW ALL MEDS BY PRESCRIBER/CLIN PHARMACIST DOCUMENTED: ICD-10-PCS | Mod: CPTII,95,, | Performed by: NURSE PRACTITIONER

## 2022-04-27 PROCEDURE — 1160F RVW MEDS BY RX/DR IN RCRD: CPT | Mod: CPTII,95,, | Performed by: NURSE PRACTITIONER

## 2022-04-27 PROCEDURE — 1159F PR MEDICATION LIST DOCUMENTED IN MEDICAL RECORD: ICD-10-PCS | Mod: CPTII,95,, | Performed by: NURSE PRACTITIONER

## 2022-04-27 PROCEDURE — 1159F MED LIST DOCD IN RCRD: CPT | Mod: CPTII,95,, | Performed by: NURSE PRACTITIONER

## 2022-04-27 PROCEDURE — 99214 OFFICE O/P EST MOD 30 MIN: CPT | Mod: 95,,, | Performed by: NURSE PRACTITIONER

## 2022-04-27 RX ORDER — METHOCARBAMOL 750 MG/1
750 TABLET, FILM COATED ORAL
Qty: 60 TABLET | Refills: 0 | Status: SHIPPED | OUTPATIENT
Start: 2022-04-27 | End: 2022-08-24

## 2022-04-27 NOTE — PROGRESS NOTES
The patient location is: at work  The chief complaint leading to consultation is: back pain    Visit type: audio only    Face to Face time with patient: 10  15 minutes of total time spent on the encounter, which includes face to face time and non-face to face time preparing to see the patient (eg, review of tests), Obtaining and/or reviewing separately obtained history, Documenting clinical information in the electronic or other health record, Independently interpreting results (not separately reported) and communicating results to the patient/family/caregiver, or Care coordination (not separately reported).         Each patient to whom he or she provides medical services by telemedicine is:  (1) informed of the relationship between the physician and patient and the respective role of any other health care provider with respect to management of the patient; and (2) notified that he or she may decline to receive medical services by telemedicine and may withdraw from such care at any time.    Notes:   Subjective:      Patient ID: Jae Nassar is a 31 y.o. male.  New to me but seen previously in clinic by a fellow provider. 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 Pain worse with movement and improved with rest and Excedrin. Reports mild improvement this AM upon waking. Denies bowel or bladder complaints, no saddle anesthesia, paresis or paresthesia.     Back Pain  This is a new problem. The current episode started yesterday. The problem occurs constantly. The problem has been waxing and waning since onset. The pain is present in the thoracic spine and costovertebral angle. The quality of the pain is described as aching and stabbing. The pain does not radiate. The pain is at a severity of 7/10. The pain is moderate. The pain is worse during the night. The symptoms are aggravated by bending, position and twisting. Stiffness is present in the  morning. Pertinent negatives include no abdominal pain, bladder incontinence, bowel incontinence, chest pain, dysuria, fever, headaches, leg pain, numbness, paresis, paresthesias, pelvic pain, perianal numbness, tingling, weakness or weight loss. Risk factors include poor posture. He has tried NSAIDs, ice and bed rest for the symptoms. The treatment provided significant relief.     Review of Systems   Constitutional: Negative for activity change, appetite change, fever, unexpected weight change and weight loss.   HENT: Negative for nasal congestion, ear pain, postnasal drip, rhinorrhea, sneezing, sore throat and voice change.    Eyes: Negative for pain and visual disturbance.   Respiratory: Negative for cough, chest tightness and shortness of breath.    Cardiovascular: Negative for chest pain, palpitations and leg swelling.   Gastrointestinal: Negative for abdominal pain, bowel incontinence, constipation, diarrhea, nausea and vomiting.   Endocrine: Negative.    Genitourinary: Negative for bladder incontinence, difficulty urinating, dysuria, hematuria and pelvic pain.   Musculoskeletal: Positive for back pain. Negative for arthralgias, gait problem, joint swelling, leg pain, myalgias, neck pain, neck stiffness and joint deformity.   Integumentary:  Negative for rash.   Allergic/Immunologic: Negative.    Neurological: Negative for tingling, speech difficulty, weakness, numbness, headaches and paresthesias.   Hematological: Negative.    Psychiatric/Behavioral: Negative.    All other systems reviewed and are negative.        Objective:   There were no vitals filed for this visit.  Physical Exam  Constitutional:       General: He is awake. He is not in acute distress.  Neck:      Trachea: Phonation normal.   Neurological:      Mental Status: He is alert and oriented to person, place, and time. Mental status is at baseline.   Psychiatric:         Attention and Perception: Attention and perception normal.         Mood and  Affect: Mood normal.         Speech: Speech normal.         Behavior: Behavior normal. Behavior is cooperative.         Thought Content: Thought content normal.         Cognition and Memory: Cognition and memory normal.         Judgment: Judgment normal.       Assessment and Plan:     1. Muscle spasm of back  Denies signs of cauda equina, no red flags, do not suspect spinal anomaly  Natural history and expected course discussed. Questions answered.  Proper lifting, bending technique discussed.  Stretching exercises discussed.  Regular aerobic and trunk strengthening exercises discussed.  Ice to affected area as needed for local pain relief.  Heat to affected area as needed for local pain relief.  OTC analgesics as needed.  Muscle relaxants per medication orders.  ok to perform job duties, RTC if fails to improve or worsens  - methocarbamoL (ROBAXIN) 750 MG Tab; Take 1 tablet (750 mg total) by mouth every 6 to 8 hours as needed (muscle spasm).  Dispense: 60 tablet; Refill: 0           KEIKO Paez, FNP-C  Family/Internal Medicine  [facility]    Answers for HPI/ROS submitted by the patient on 4/27/2022  genital pain: No

## 2022-08-24 ENCOUNTER — OFFICE VISIT (OUTPATIENT)
Dept: FAMILY MEDICINE | Facility: CLINIC | Age: 32
End: 2022-08-24
Payer: COMMERCIAL

## 2022-08-24 ENCOUNTER — PATIENT MESSAGE (OUTPATIENT)
Dept: FAMILY MEDICINE | Facility: CLINIC | Age: 32
End: 2022-08-24
Payer: COMMERCIAL

## 2022-08-24 ENCOUNTER — APPOINTMENT (OUTPATIENT)
Dept: RADIOLOGY | Facility: HOSPITAL | Age: 32
End: 2022-08-24
Attending: FAMILY MEDICINE
Payer: COMMERCIAL

## 2022-08-24 VITALS
HEART RATE: 108 BPM | DIASTOLIC BLOOD PRESSURE: 80 MMHG | WEIGHT: 147.94 LBS | SYSTOLIC BLOOD PRESSURE: 120 MMHG | BODY MASS INDEX: 23.22 KG/M2 | TEMPERATURE: 99 F | HEIGHT: 67 IN | RESPIRATION RATE: 16 BRPM | OXYGEN SATURATION: 98 %

## 2022-08-24 DIAGNOSIS — M54.50 ACUTE LEFT-SIDED LOW BACK PAIN WITHOUT SCIATICA: ICD-10-CM

## 2022-08-24 DIAGNOSIS — R10.9 FLANK PAIN: ICD-10-CM

## 2022-08-24 DIAGNOSIS — M54.50 ACUTE LEFT-SIDED LOW BACK PAIN WITHOUT SCIATICA: Primary | ICD-10-CM

## 2022-08-24 DIAGNOSIS — M62.838 MUSCLE SPASM: ICD-10-CM

## 2022-08-24 PROCEDURE — 99213 PR OFFICE/OUTPT VISIT, EST, LEVL III, 20-29 MIN: ICD-10-PCS | Mod: S$GLB,,, | Performed by: FAMILY MEDICINE

## 2022-08-24 PROCEDURE — 3008F BODY MASS INDEX DOCD: CPT | Mod: CPTII,S$GLB,, | Performed by: FAMILY MEDICINE

## 2022-08-24 PROCEDURE — 3079F PR MOST RECENT DIASTOLIC BLOOD PRESSURE 80-89 MM HG: ICD-10-PCS | Mod: CPTII,S$GLB,, | Performed by: FAMILY MEDICINE

## 2022-08-24 PROCEDURE — 1159F PR MEDICATION LIST DOCUMENTED IN MEDICAL RECORD: ICD-10-PCS | Mod: CPTII,S$GLB,, | Performed by: FAMILY MEDICINE

## 2022-08-24 PROCEDURE — 99999 PR PBB SHADOW E&M-EST. PATIENT-LVL IV: CPT | Mod: PBBFAC,,, | Performed by: FAMILY MEDICINE

## 2022-08-24 PROCEDURE — 3074F SYST BP LT 130 MM HG: CPT | Mod: CPTII,S$GLB,, | Performed by: FAMILY MEDICINE

## 2022-08-24 PROCEDURE — 1159F MED LIST DOCD IN RCRD: CPT | Mod: CPTII,S$GLB,, | Performed by: FAMILY MEDICINE

## 2022-08-24 PROCEDURE — 3008F PR BODY MASS INDEX (BMI) DOCUMENTED: ICD-10-PCS | Mod: CPTII,S$GLB,, | Performed by: FAMILY MEDICINE

## 2022-08-24 PROCEDURE — 74018 RADEX ABDOMEN 1 VIEW: CPT | Mod: TC,FY,PN

## 2022-08-24 PROCEDURE — 74018 XR ABDOMEN AP 1 VIEW: ICD-10-PCS | Mod: 26,,, | Performed by: INTERNAL MEDICINE

## 2022-08-24 PROCEDURE — 99213 OFFICE O/P EST LOW 20 MIN: CPT | Mod: S$GLB,,, | Performed by: FAMILY MEDICINE

## 2022-08-24 PROCEDURE — 3079F DIAST BP 80-89 MM HG: CPT | Mod: CPTII,S$GLB,, | Performed by: FAMILY MEDICINE

## 2022-08-24 PROCEDURE — 3074F PR MOST RECENT SYSTOLIC BLOOD PRESSURE < 130 MM HG: ICD-10-PCS | Mod: CPTII,S$GLB,, | Performed by: FAMILY MEDICINE

## 2022-08-24 PROCEDURE — 99999 PR PBB SHADOW E&M-EST. PATIENT-LVL IV: ICD-10-PCS | Mod: PBBFAC,,, | Performed by: FAMILY MEDICINE

## 2022-08-24 PROCEDURE — 74018 RADEX ABDOMEN 1 VIEW: CPT | Mod: 26,,, | Performed by: INTERNAL MEDICINE

## 2022-08-24 RX ORDER — TAMSULOSIN HYDROCHLORIDE 0.4 MG/1
CAPSULE ORAL DAILY
COMMUNITY
End: 2023-10-17

## 2022-08-24 RX ORDER — TIZANIDINE 4 MG/1
4 TABLET ORAL EVERY 6 HOURS PRN
Qty: 30 TABLET | Refills: 0 | Status: SHIPPED | OUTPATIENT
Start: 2022-08-24 | End: 2022-09-03

## 2022-08-24 NOTE — PROGRESS NOTES
Likely muscle strain. Will order KUB due to RBC's in UA, he has been given stretching exercises ; he had 5/5 LE strength and had negative straight leg test, pain located in lower lumbar region      I have seen and examined the patient and agree with the medical students finding in the progress note    Sandra Choudhury MD

## 2022-08-24 NOTE — PROGRESS NOTES
Chief Complaint  Chief Complaint   Patient presents with    Back Pain       HPI  Jae Nassar is a 32 y.o. male with multiple medical diagnoses as listed in the medical history and problem list that presents for back pain. He reports left lower lumbar pain ongoing for approximately 10 days, but worsened last Thursday. He describes the pain as dull, diffuse, achy, and constant throughout the day. It radiated to the groin last Thursday and Friday. At its worse, it is a 7/10. It is currently 3/10 and non-radiating. He notices it improves with stretching and exercise, especially walking. He played tennis and basketball over the weekend and noticed improvement in the pain. He has taken tylenol and robaxin, but unsure if these have helped. He notices it's worse after sitting. Denies any injury, new exercises, or heavy lifting. No tenderness to palpation. Pertinent negatives include fever, chills, night sweats, headaches, leg pain, numbness, paresis, paresthesias, and perianal numbness.     Seen at urgent care on 8/19 for acute back pain. Urine dipstick + for RBC. Patient was prescribed tamulosin for possible stone and robaxin for possible muscle spasm. Patient has been taking tamsulosin, but only taken two doses of robaxin.      PAST MEDICAL HISTORY:  Past Medical History:   Diagnosis Date    Duodenum ulcer 08/2017    Headache        PAST SURGICAL HISTORY:  Past Surgical History:   Procedure Laterality Date    ORTHOPEDIC SURGERY Left 2010    labrum repair    SHOULDER SURGERY         SOCIAL HISTORY:  Social History     Socioeconomic History    Marital status:    Tobacco Use    Smoking status: Never Smoker    Smokeless tobacco: Never Used   Substance and Sexual Activity    Alcohol use: No    Drug use: No    Sexual activity: Yes     Partners: Female       FAMILY HISTORY:  No family history on file.    ALLERGIES AND MEDICATIONS: updated and reviewed.  Review of patient's allergies indicates:  "  Allergen Reactions    Pcn [penicillins]      Current Outpatient Medications   Medication Sig Dispense Refill    methocarbamoL (ROBAXIN) 750 MG Tab Take 1 tablet (750 mg total) by mouth every 6 to 8 hours as needed (muscle spasm). 60 tablet 0    tamsulosin (FLOMAX) 0.4 mg Cap Take by mouth once daily.      multivitamin capsule Take 1 capsule by mouth once daily. 3x per week       No current facility-administered medications for this visit.         ROS  Review of Systems   Constitutional: Negative for activity change, chills, fever and unexpected weight change.   HENT: Negative for congestion and sore throat.    Respiratory: Negative for cough, chest tightness and shortness of breath.    Cardiovascular: Negative for chest pain and palpitations.   Gastrointestinal: Negative for abdominal pain, constipation and diarrhea.   Genitourinary: Negative for decreased urine volume, difficulty urinating, flank pain, hematuria and urgency.   Musculoskeletal: Positive for back pain. Negative for joint swelling and neck pain.   Neurological: Negative for tremors, numbness and headaches.   Psychiatric/Behavioral: The patient is nervous/anxious.            Physical Exam  Vitals:    08/24/22 0943   BP: 120/80   Pulse: 108   Resp: 16   Temp: 99.3 °F (37.4 °C)   TempSrc: Oral   SpO2: 98%   Weight: 67.1 kg (147 lb 14.9 oz)   Height: 5' 7" (1.702 m)    Body mass index is 23.17 kg/m².  Weight: 67.1 kg (147 lb 14.9 oz)   Height: 5' 7" (170.2 cm)     Physical Exam  Constitutional:       Appearance: Normal appearance. He is normal weight.   HENT:      Head: Normocephalic.   Cardiovascular:      Rate and Rhythm: Regular rhythm. Tachycardia present.      Pulses: Normal pulses.   Pulmonary:      Effort: Pulmonary effort is normal. No respiratory distress.      Breath sounds: Normal breath sounds.   Abdominal:      General: Abdomen is flat. Bowel sounds are normal.      Palpations: Abdomen is soft.      Tenderness: There is no right CVA " tenderness or left CVA tenderness.   Musculoskeletal:         General: No tenderness, deformity or signs of injury.      Comments: Left lower back pain   Skin:     General: Skin is warm and dry.   Neurological:      General: No focal deficit present.      Mental Status: He is alert and oriented to person, place, and time.           Health Maintenance       Date Due Completion Date    Hepatitis C Screening Never done ---    HIV Screening Never done ---    Influenza Vaccine (1) 09/01/2022 11/16/2020    TETANUS VACCINE 09/20/2027 9/20/2017            Assessment and Plan:    1. Acute left-sided low back pain without sciatica  Patient describes constant dull, achy, left lower lumbar pain. Given the quality and duration of pain, likely musculoskeletal in nature and would improve with muscle relaxant. Given pain has also radiated to his groin, and previous urine dipstick positive for RBC on 8/19, will also obtain UA and KUB to rule out kidney stone.  - Urinalysis; Future  - X-Ray Abdomen AP 1 View; Future  - tiZANidine (ZANAFLEX) 4 MG tablet; Take 1 tablet (4 mg total) by mouth every 6 (six) hours as needed.  Dispense: 30 tablet; Refill: 0

## 2022-08-25 ENCOUNTER — PATIENT MESSAGE (OUTPATIENT)
Dept: FAMILY MEDICINE | Facility: CLINIC | Age: 32
End: 2022-08-25
Payer: COMMERCIAL

## 2022-08-25 DIAGNOSIS — R10.9 FLANK PAIN: Primary | ICD-10-CM

## 2022-12-15 ENCOUNTER — PATIENT MESSAGE (OUTPATIENT)
Dept: FAMILY MEDICINE | Facility: CLINIC | Age: 32
End: 2022-12-15
Payer: COMMERCIAL

## 2023-02-01 ENCOUNTER — OFFICE VISIT (OUTPATIENT)
Dept: FAMILY MEDICINE | Facility: CLINIC | Age: 33
End: 2023-02-01
Payer: COMMERCIAL

## 2023-02-01 VITALS
HEART RATE: 80 BPM | TEMPERATURE: 98 F | OXYGEN SATURATION: 99 % | WEIGHT: 153.69 LBS | BODY MASS INDEX: 24.12 KG/M2 | SYSTOLIC BLOOD PRESSURE: 110 MMHG | HEIGHT: 67 IN | DIASTOLIC BLOOD PRESSURE: 70 MMHG

## 2023-02-01 DIAGNOSIS — Z00.00 ANNUAL PHYSICAL EXAM: Primary | ICD-10-CM

## 2023-02-01 PROCEDURE — 3008F PR BODY MASS INDEX (BMI) DOCUMENTED: ICD-10-PCS | Mod: CPTII,S$GLB,, | Performed by: FAMILY MEDICINE

## 2023-02-01 PROCEDURE — 3044F PR MOST RECENT HEMOGLOBIN A1C LEVEL <7.0%: ICD-10-PCS | Mod: CPTII,S$GLB,, | Performed by: FAMILY MEDICINE

## 2023-02-01 PROCEDURE — 3078F DIAST BP <80 MM HG: CPT | Mod: CPTII,S$GLB,, | Performed by: FAMILY MEDICINE

## 2023-02-01 PROCEDURE — 1159F MED LIST DOCD IN RCRD: CPT | Mod: CPTII,S$GLB,, | Performed by: FAMILY MEDICINE

## 2023-02-01 PROCEDURE — 99395 PREV VISIT EST AGE 18-39: CPT | Mod: S$GLB,,, | Performed by: FAMILY MEDICINE

## 2023-02-01 PROCEDURE — 3074F PR MOST RECENT SYSTOLIC BLOOD PRESSURE < 130 MM HG: ICD-10-PCS | Mod: CPTII,S$GLB,, | Performed by: FAMILY MEDICINE

## 2023-02-01 PROCEDURE — 1159F PR MEDICATION LIST DOCUMENTED IN MEDICAL RECORD: ICD-10-PCS | Mod: CPTII,S$GLB,, | Performed by: FAMILY MEDICINE

## 2023-02-01 PROCEDURE — 3074F SYST BP LT 130 MM HG: CPT | Mod: CPTII,S$GLB,, | Performed by: FAMILY MEDICINE

## 2023-02-01 PROCEDURE — 99395 PR PREVENTIVE VISIT,EST,18-39: ICD-10-PCS | Mod: S$GLB,,, | Performed by: FAMILY MEDICINE

## 2023-02-01 PROCEDURE — 3044F HG A1C LEVEL LT 7.0%: CPT | Mod: CPTII,S$GLB,, | Performed by: FAMILY MEDICINE

## 2023-02-01 PROCEDURE — 3008F BODY MASS INDEX DOCD: CPT | Mod: CPTII,S$GLB,, | Performed by: FAMILY MEDICINE

## 2023-02-01 PROCEDURE — 99999 PR PBB SHADOW E&M-EST. PATIENT-LVL III: ICD-10-PCS | Mod: PBBFAC,,, | Performed by: FAMILY MEDICINE

## 2023-02-01 PROCEDURE — 99999 PR PBB SHADOW E&M-EST. PATIENT-LVL III: CPT | Mod: PBBFAC,,, | Performed by: FAMILY MEDICINE

## 2023-02-01 PROCEDURE — 3078F PR MOST RECENT DIASTOLIC BLOOD PRESSURE < 80 MM HG: ICD-10-PCS | Mod: CPTII,S$GLB,, | Performed by: FAMILY MEDICINE

## 2023-02-01 NOTE — PROGRESS NOTES
"Chief Complaint   Patient presents with    Annual Exam       SUBJECTIVE:   Jae Nassar is a 32 y.o. male presenting for his annual checkup.   Current Outpatient Medications   Medication Sig Dispense Refill    multivitamin capsule Take 1 capsule by mouth once daily. 3x per week      tamsulosin (FLOMAX) 0.4 mg Cap Take by mouth once daily.       No current facility-administered medications for this visit.     Allergies: Pcn [penicillins]   There are no problems to display for this patient.    Review of Systems   HENT:  Negative for hearing loss.    Eyes:  Negative for discharge.   Respiratory:  Negative for wheezing.    Cardiovascular:  Negative for chest pain and palpitations.   Gastrointestinal:  Negative for blood in stool, constipation, diarrhea and vomiting.   Genitourinary:  Negative for hematuria and urgency.   Musculoskeletal:  Negative for neck pain.   Neurological:  Negative for weakness and headaches.   Endo/Heme/Allergies:  Negative for polydipsia.   Answers submitted by the patient for this visit:  Review of Systems Questionnaire (Submitted on 2/1/2023)  activity change: No  unexpected weight change: No  rhinorrhea: No  trouble swallowing: No  visual disturbance: No  chest tightness: No  polyuria: No  difficulty urinating: No  joint swelling: No  arthralgias: No  confusion: No  dysphoric mood: Yes    OBJECTIVE:   The patient appears well, alert, oriented x 3, in no distress.   /70 (BP Location: Left arm, Patient Position: Sitting, BP Method: Medium (Manual))   Pulse 80   Temp 98.2 °F (36.8 °C) (Oral)   Ht 5' 7" (1.702 m)   Wt 69.7 kg (153 lb 10.6 oz)   SpO2 99%   BMI 24.07 kg/m²   Wt Readings from Last 5 Encounters:   02/01/23 69.7 kg (153 lb 10.6 oz)   08/24/22 67.1 kg (147 lb 14.9 oz)   01/06/22 67 kg (147 lb 11.3 oz)   11/05/20 69.4 kg (153 lb)   11/15/19 69.4 kg (153 lb)       ENT normal.  Neck supple. No adenopathy or thyromegaly. PAT. Lungs are clear, good air entry, no " wheezes, rhonchi or rales. S1 and S2 normal, no murmurs, regular rate and rhythm. Abdomen is soft without tenderness, guarding, mass or organomegaly.  exam: deferred.  Extremities show no edema, normal peripheral pulses. Neurological is normal without focal findings.    ASSESSMENT:   1. Annual physical exam          PLAN:   Counseled on age appropriate medical preventative services, including age appropriate cancer screenings, over all nutritional health, need for a consistent exercise regimen and an over all push towards maintaining a vigorous and active lifestyle.  Counseled on age appropriate vaccines and discussed upcoming health care needs based on age/gender.  Spent time with patient counseling on need for a good patient/doctor relationship moving forward.  Discussed use of common OTC medications and supplements.  Discussed common dietary aids and use of caffeine and the need for good sleep hygiene and stress management.    Problem List Items Addressed This Visit    None  Visit Diagnoses       Annual physical exam    -  Primary

## 2023-02-02 PROBLEM — E78.6 LOW HDL (UNDER 40): Status: ACTIVE | Noted: 2023-02-02

## 2023-02-02 PROBLEM — E79.0 HYPERURICEMIA: Status: ACTIVE | Noted: 2023-02-02

## 2023-10-16 NOTE — PROGRESS NOTES
Chief Complaint:   Undesired fertility    HPI:  Mr. Nassar is a 33 y.o.  male who presents for evaluation re vasectomy. He has 2 children and he is certain that he desires no more. He is aware of other forms of contraception.   He is aware that vasectomy is to be considered permanent/irreversible.    He denies orchalgia.  No dysuria.  No hematuria.    ROS:  Jae Nassar denies headache, blurred vision, fever, nausea, vomiting, chills, abdominal pain, chest pain, sore throat, bleeding per rectum, cough, SOB, recent loss of consciousness, recent mental status changes, seizures, dizziness, or upper or lower extremity weakness.    Past Medical History    Past Medical History:   Diagnosis Date    Duodenum ulcer 08/2017    Headache        Past Surgical History    Past Surgical History:   Procedure Laterality Date    ORTHOPEDIC SURGERY Left 2010    labrum repair    SHOULDER SURGERY         Social History    Social History     Socioeconomic History    Marital status:    Tobacco Use    Smoking status: Never    Smokeless tobacco: Never   Substance and Sexual Activity    Alcohol use: No    Drug use: No    Sexual activity: Yes     Partners: Female       Family History  No family history on file.      Medications    Current Outpatient Medications:     diazePAM (VALIUM) 10 MG Tab, Take 1 tablet (10 mg total) by mouth once. for 1 dose, Disp: 1 tablet, Rfl: 0    Allergies  Review of patient's allergies indicates:   Allergen Reactions    Pcn [penicillins]          ?  PHYSICAL EXAM:    The patient generally appears in good health, is appropriately interactive, and is in no apparent distress.     Eyes: anicteric sclerae, moist conjunctivae; no lid-lag; PERRLA     HENT: Atraumatic; oropharynx clear with moist mucous membranes and no mucosal ulcerations;normal hard and soft palate.  No evidence of lymphadenopathy.    Neck: Trachea midline.  No thyromegaly.    Musculoskeletal: No abnormal gait.    Skin: No  lesions.    Mental: Cooperative with normal affect.  Is oriented to time, place, and person.    Neuro: Grossly intact.    Chest: Normal inspiratory effort.   No accessory muscles.  No audible wheezes.  Respirations symmetric on inspiration and expiration.    Heart: Regular rhythm.      Abdomen:  Soft, non-tender. No masses or organomegaly. Bladder is not palpable. No evidence of flank discomfort. No evidence of inguinal hernia.    Genitourinary: The penis has no evidence of plaques or induration. The urethral meatus is normal. The testes, epididymides, and cord structures are normal in size and contour bilaterally. The scrotum is normal in size and contour. (High riding right testes, structures easily palpated)     Extremities: No clubbing, cyanosis, or edema      Assessment:   Jae Nassar is a 33 y.o. male who presents for evaluation regarding vasectomy.    Plan:   I discussed with the patient risks and benefits, as well as alternatives. We discussed possible complications at length, including fever, infection, bleeding--possibly requiring reoperation,testicular injury or atrophy with loss of function, chronic pain, persistent or recurrent presence of sperm in the ejaculate, vasal recanalization, as well as the risks attendant to the anesthetic.The patient was informed that the long term effects of vasectomy are unknown and could be associated with a higher risk of prostate cancer.   We discussed that he should stop aspirin, ibuprofen, and similar products at least one week prior to the procedure. Acetominophen is okay to use for headaches, pain, etc.  I recommended that he bring an athletic supporter on the day of the procedure.  We discussed that he must continue to use contraception for 3 months and until semen analyses reveals azoospermia.  He will schedule an elective vasectomy.  Prescription provided for valium with instructions to take upon arrival at office the day of the procedure.  Consent  signed and held for MD signature

## 2023-10-17 ENCOUNTER — OFFICE VISIT (OUTPATIENT)
Dept: UROLOGY | Facility: CLINIC | Age: 33
End: 2023-10-17
Payer: COMMERCIAL

## 2023-10-17 VITALS
HEIGHT: 67 IN | HEART RATE: 75 BPM | BODY MASS INDEX: 24.76 KG/M2 | WEIGHT: 157.75 LBS | SYSTOLIC BLOOD PRESSURE: 127 MMHG | DIASTOLIC BLOOD PRESSURE: 79 MMHG

## 2023-10-17 DIAGNOSIS — Z30.09 SCREENING AND EVALUATION FOR VASECTOMY: Primary | ICD-10-CM

## 2023-10-17 PROCEDURE — 3078F DIAST BP <80 MM HG: CPT | Mod: CPTII,S$GLB,, | Performed by: NURSE PRACTITIONER

## 2023-10-17 PROCEDURE — 99204 PR OFFICE/OUTPT VISIT, NEW, LEVL IV, 45-59 MIN: ICD-10-PCS | Mod: S$GLB,,, | Performed by: NURSE PRACTITIONER

## 2023-10-17 PROCEDURE — 99204 OFFICE O/P NEW MOD 45 MIN: CPT | Mod: S$GLB,,, | Performed by: NURSE PRACTITIONER

## 2023-10-17 PROCEDURE — 3008F PR BODY MASS INDEX (BMI) DOCUMENTED: ICD-10-PCS | Mod: CPTII,S$GLB,, | Performed by: NURSE PRACTITIONER

## 2023-10-17 PROCEDURE — 99999 PR PBB SHADOW E&M-EST. PATIENT-LVL III: CPT | Mod: PBBFAC,,, | Performed by: NURSE PRACTITIONER

## 2023-10-17 PROCEDURE — 1159F PR MEDICATION LIST DOCUMENTED IN MEDICAL RECORD: ICD-10-PCS | Mod: CPTII,S$GLB,, | Performed by: NURSE PRACTITIONER

## 2023-10-17 PROCEDURE — 3074F PR MOST RECENT SYSTOLIC BLOOD PRESSURE < 130 MM HG: ICD-10-PCS | Mod: CPTII,S$GLB,, | Performed by: NURSE PRACTITIONER

## 2023-10-17 PROCEDURE — 1159F MED LIST DOCD IN RCRD: CPT | Mod: CPTII,S$GLB,, | Performed by: NURSE PRACTITIONER

## 2023-10-17 PROCEDURE — 1160F RVW MEDS BY RX/DR IN RCRD: CPT | Mod: CPTII,S$GLB,, | Performed by: NURSE PRACTITIONER

## 2023-10-17 PROCEDURE — 3008F BODY MASS INDEX DOCD: CPT | Mod: CPTII,S$GLB,, | Performed by: NURSE PRACTITIONER

## 2023-10-17 PROCEDURE — 3074F SYST BP LT 130 MM HG: CPT | Mod: CPTII,S$GLB,, | Performed by: NURSE PRACTITIONER

## 2023-10-17 PROCEDURE — 3078F PR MOST RECENT DIASTOLIC BLOOD PRESSURE < 80 MM HG: ICD-10-PCS | Mod: CPTII,S$GLB,, | Performed by: NURSE PRACTITIONER

## 2023-10-17 PROCEDURE — 3044F PR MOST RECENT HEMOGLOBIN A1C LEVEL <7.0%: ICD-10-PCS | Mod: CPTII,S$GLB,, | Performed by: NURSE PRACTITIONER

## 2023-10-17 PROCEDURE — 99999 PR PBB SHADOW E&M-EST. PATIENT-LVL III: ICD-10-PCS | Mod: PBBFAC,,, | Performed by: NURSE PRACTITIONER

## 2023-10-17 PROCEDURE — 1160F PR REVIEW ALL MEDS BY PRESCRIBER/CLIN PHARMACIST DOCUMENTED: ICD-10-PCS | Mod: CPTII,S$GLB,, | Performed by: NURSE PRACTITIONER

## 2023-10-17 PROCEDURE — 3044F HG A1C LEVEL LT 7.0%: CPT | Mod: CPTII,S$GLB,, | Performed by: NURSE PRACTITIONER

## 2023-10-17 RX ORDER — DIAZEPAM 10 MG/1
10 TABLET ORAL ONCE
Qty: 1 TABLET | Refills: 0 | Status: SHIPPED | OUTPATIENT
Start: 2023-10-17 | End: 2023-10-17

## 2023-11-09 ENCOUNTER — PATIENT MESSAGE (OUTPATIENT)
Dept: UROLOGY | Facility: CLINIC | Age: 33
End: 2023-11-09
Payer: COMMERCIAL

## 2023-12-07 ENCOUNTER — PATIENT MESSAGE (OUTPATIENT)
Dept: UROLOGY | Facility: CLINIC | Age: 33
End: 2023-12-07
Payer: COMMERCIAL

## 2024-01-08 ENCOUNTER — PATIENT MESSAGE (OUTPATIENT)
Dept: UROLOGY | Facility: CLINIC | Age: 34
End: 2024-01-08
Payer: COMMERCIAL

## 2024-04-09 ENCOUNTER — PATIENT MESSAGE (OUTPATIENT)
Dept: UROLOGY | Facility: CLINIC | Age: 34
End: 2024-04-09
Payer: COMMERCIAL

## 2024-04-10 ENCOUNTER — TELEPHONE (OUTPATIENT)
Dept: UROLOGY | Facility: CLINIC | Age: 34
End: 2024-04-10
Payer: COMMERCIAL

## 2024-04-10 NOTE — TELEPHONE ENCOUNTER
Rescheduled patient for Vasectomy due to weather on 4/10/2024.  Answered all of patient questions.  Patient verbalized understanding.    ELYSSA Brito

## 2024-04-10 NOTE — TELEPHONE ENCOUNTER
Returned call. No answer, left detailed message.    ----- Message from Zaynab Diez sent at 4/10/2024  7:23 AM CDT -----  Type:  Sooner Apoointment Request    Caller is requesting a sooner appointment.  Caller declined first available appointment listed below.  Caller will not accept being placed on the waitlist and is requesting a message be sent to doctor.  Name of Caller:pt   When is the first available appointment?none   Symptoms:procedure   Would the patient rather a call back or a response via [facility]? call  Best Call Back Number:[phone]  Additional Information: states he needs to reschedule due to weather

## 2024-04-10 NOTE — TELEPHONE ENCOUNTER
----- Message from Ania Henry sent at 4/10/2024  8:59 AM CDT -----  Regarding: Patient Returning Missed Call  Patient is returning missed call . Pts call back-  [phone]

## 2024-04-10 NOTE — TELEPHONE ENCOUNTER
Attempted to call patient.  Left voicemail and send portal message regarding rescheduling patient appointment.    ELYSSA Brito

## 2024-05-13 ENCOUNTER — OFFICE VISIT (OUTPATIENT)
Dept: FAMILY MEDICINE | Facility: CLINIC | Age: 34
End: 2024-05-13
Payer: COMMERCIAL

## 2024-05-13 ENCOUNTER — LAB VISIT (OUTPATIENT)
Dept: LAB | Facility: HOSPITAL | Age: 34
End: 2024-05-13
Attending: FAMILY MEDICINE
Payer: COMMERCIAL

## 2024-05-13 VITALS
WEIGHT: 154.31 LBS | OXYGEN SATURATION: 99 % | SYSTOLIC BLOOD PRESSURE: 102 MMHG | BODY MASS INDEX: 24.22 KG/M2 | HEIGHT: 67 IN | TEMPERATURE: 98 F | DIASTOLIC BLOOD PRESSURE: 66 MMHG | HEART RATE: 90 BPM

## 2024-05-13 DIAGNOSIS — Z00.00 ANNUAL PHYSICAL EXAM: Primary | ICD-10-CM

## 2024-05-13 DIAGNOSIS — Z00.00 ANNUAL PHYSICAL EXAM: ICD-10-CM

## 2024-05-13 LAB
ALBUMIN SERPL BCP-MCNC: 4.3 G/DL (ref 3.5–5.2)
ALP SERPL-CCNC: 62 U/L (ref 55–135)
ALT SERPL W/O P-5'-P-CCNC: 29 U/L (ref 10–44)
ANION GAP SERPL CALC-SCNC: 9 MMOL/L (ref 8–16)
AST SERPL-CCNC: 18 U/L (ref 10–40)
BASOPHILS # BLD AUTO: 0.06 K/UL (ref 0–0.2)
BASOPHILS NFR BLD: 1 % (ref 0–1.9)
BILIRUB SERPL-MCNC: 0.6 MG/DL (ref 0.1–1)
BILIRUB UR QL STRIP: NEGATIVE
BUN SERPL-MCNC: 16 MG/DL (ref 6–20)
CALCIUM SERPL-MCNC: 9.5 MG/DL (ref 8.7–10.5)
CHLORIDE SERPL-SCNC: 106 MMOL/L (ref 95–110)
CHOLEST SERPL-MCNC: 161 MG/DL (ref 120–199)
CHOLEST/HDLC SERPL: 4 {RATIO} (ref 2–5)
CLARITY UR: CLEAR
CO2 SERPL-SCNC: 25 MMOL/L (ref 23–29)
COLOR UR: YELLOW
CREAT SERPL-MCNC: 1 MG/DL (ref 0.5–1.4)
DIFFERENTIAL METHOD BLD: NORMAL
EOSINOPHIL # BLD AUTO: 0.1 K/UL (ref 0–0.5)
EOSINOPHIL NFR BLD: 2.2 % (ref 0–8)
ERYTHROCYTE [DISTWIDTH] IN BLOOD BY AUTOMATED COUNT: 11.6 % (ref 11.5–14.5)
EST. GFR  (NO RACE VARIABLE): >60 ML/MIN/1.73 M^2
ESTIMATED AVG GLUCOSE: 85 MG/DL (ref 68–131)
GLUCOSE SERPL-MCNC: 92 MG/DL (ref 70–110)
GLUCOSE UR QL STRIP: NEGATIVE
HBA1C MFR BLD: 4.6 % (ref 4–5.6)
HCT VFR BLD AUTO: 45.6 % (ref 40–54)
HDLC SERPL-MCNC: 40 MG/DL (ref 40–75)
HDLC SERPL: 24.8 % (ref 20–50)
HGB BLD-MCNC: 15.6 G/DL (ref 14–18)
HGB UR QL STRIP: NEGATIVE
IMM GRANULOCYTES # BLD AUTO: 0.02 K/UL (ref 0–0.04)
IMM GRANULOCYTES NFR BLD AUTO: 0.3 % (ref 0–0.5)
KETONES UR QL STRIP: NEGATIVE
LDLC SERPL CALC-MCNC: 105.8 MG/DL (ref 63–159)
LEUKOCYTE ESTERASE UR QL STRIP: NEGATIVE
LYMPHOCYTES # BLD AUTO: 1.6 K/UL (ref 1–4.8)
LYMPHOCYTES NFR BLD: 25.7 % (ref 18–48)
MCH RBC QN AUTO: 29.8 PG (ref 27–31)
MCHC RBC AUTO-ENTMCNC: 34.2 G/DL (ref 32–36)
MCV RBC AUTO: 87 FL (ref 82–98)
MONOCYTES # BLD AUTO: 0.4 K/UL (ref 0.3–1)
MONOCYTES NFR BLD: 7.3 % (ref 4–15)
NEUTROPHILS # BLD AUTO: 3.8 K/UL (ref 1.8–7.7)
NEUTROPHILS NFR BLD: 63.5 % (ref 38–73)
NITRITE UR QL STRIP: NEGATIVE
NONHDLC SERPL-MCNC: 121 MG/DL
NRBC BLD-RTO: 0 /100 WBC
PH UR STRIP: 6 [PH] (ref 5–8)
PLATELET # BLD AUTO: 198 K/UL (ref 150–450)
PMV BLD AUTO: 11.3 FL (ref 9.2–12.9)
POTASSIUM SERPL-SCNC: 4.5 MMOL/L (ref 3.5–5.1)
PROT SERPL-MCNC: 7.1 G/DL (ref 6–8.4)
PROT UR QL STRIP: NEGATIVE
RBC # BLD AUTO: 5.23 M/UL (ref 4.6–6.2)
SODIUM SERPL-SCNC: 140 MMOL/L (ref 136–145)
SP GR UR STRIP: 1.02 (ref 1–1.03)
TRIGL SERPL-MCNC: 76 MG/DL (ref 30–150)
TSH SERPL DL<=0.005 MIU/L-ACNC: 0.74 UIU/ML (ref 0.4–4)
URATE SERPL-MCNC: 8.3 MG/DL (ref 3.4–7)
URN SPEC COLLECT METH UR: NORMAL
UROBILINOGEN UR STRIP-ACNC: NEGATIVE EU/DL
WBC # BLD AUTO: 6.03 K/UL (ref 3.9–12.7)

## 2024-05-13 PROCEDURE — 3074F SYST BP LT 130 MM HG: CPT | Mod: CPTII,S$GLB,, | Performed by: FAMILY MEDICINE

## 2024-05-13 PROCEDURE — 3008F BODY MASS INDEX DOCD: CPT | Mod: CPTII,S$GLB,, | Performed by: FAMILY MEDICINE

## 2024-05-13 PROCEDURE — 81003 URINALYSIS AUTO W/O SCOPE: CPT | Performed by: FAMILY MEDICINE

## 2024-05-13 PROCEDURE — 99999 PR PBB SHADOW E&M-EST. PATIENT-LVL III: CPT | Mod: PBBFAC,,, | Performed by: FAMILY MEDICINE

## 2024-05-13 PROCEDURE — 1159F MED LIST DOCD IN RCRD: CPT | Mod: CPTII,S$GLB,, | Performed by: FAMILY MEDICINE

## 2024-05-13 PROCEDURE — 84443 ASSAY THYROID STIM HORMONE: CPT | Performed by: FAMILY MEDICINE

## 2024-05-13 PROCEDURE — 84550 ASSAY OF BLOOD/URIC ACID: CPT | Performed by: FAMILY MEDICINE

## 2024-05-13 PROCEDURE — 36415 COLL VENOUS BLD VENIPUNCTURE: CPT | Mod: PO | Performed by: FAMILY MEDICINE

## 2024-05-13 PROCEDURE — 80061 LIPID PANEL: CPT | Performed by: FAMILY MEDICINE

## 2024-05-13 PROCEDURE — 85025 COMPLETE CBC W/AUTO DIFF WBC: CPT | Performed by: FAMILY MEDICINE

## 2024-05-13 PROCEDURE — 3078F DIAST BP <80 MM HG: CPT | Mod: CPTII,S$GLB,, | Performed by: FAMILY MEDICINE

## 2024-05-13 PROCEDURE — 80053 COMPREHEN METABOLIC PANEL: CPT | Performed by: FAMILY MEDICINE

## 2024-05-13 PROCEDURE — 1160F RVW MEDS BY RX/DR IN RCRD: CPT | Mod: CPTII,S$GLB,, | Performed by: FAMILY MEDICINE

## 2024-05-13 PROCEDURE — 99395 PREV VISIT EST AGE 18-39: CPT | Mod: S$GLB,,, | Performed by: FAMILY MEDICINE

## 2024-05-13 PROCEDURE — 83036 HEMOGLOBIN GLYCOSYLATED A1C: CPT | Performed by: FAMILY MEDICINE

## 2024-05-13 NOTE — PROGRESS NOTES
"Chief Complaint   Patient presents with    Annual Exam       SUBJECTIVE:   Jae Nassar is a 33 y.o. male presenting for his annual checkup.   No current outpatient medications on file.     No current facility-administered medications for this visit.     Allergies: Pcn [penicillins]   Patient Active Problem List    Diagnosis Date Noted    Low HDL (under 40) 02/02/2023    Hyperuricemia 02/02/2023       ROS:  Feeling well. No dyspnea or chest pain on exertion. No abdominal pain, change in bowel habits, black or bloody stools. No urinary tract or prostatic symptoms. No neurological complaints.    OBJECTIVE:   The patient appears well, alert, oriented x 3, in no distress.   /66   Pulse 90   Temp 98.3 °F (36.8 °C) (Oral)   Ht 5' 7" (1.702 m)   Wt 70 kg (154 lb 5.2 oz)   SpO2 99%   BMI 24.17 kg/m²   Wt Readings from Last 5 Encounters:   05/13/24 70 kg (154 lb 5.2 oz)   10/17/23 71.6 kg (157 lb 11.8 oz)   02/01/23 69.7 kg (153 lb 10.6 oz)   08/24/22 67.1 kg (147 lb 14.9 oz)   01/06/22 67 kg (147 lb 11.3 oz)       ENT normal.  Neck supple. No adenopathy or thyromegaly. PAT. Lungs are clear, good air entry, no wheezes, rhonchi or rales. S1 and S2 normal, no murmurs, regular rate and rhythm. Abdomen is soft without tenderness, guarding, mass or organomegaly.  exam: deferred.  Extremities show no edema, normal peripheral pulses. Neurological is normal without focal findings.    ASSESSMENT:   1. Annual physical exam          PLAN:   Counseled on age appropriate medical preventative services, including age appropriate cancer screenings, over all nutritional health, need for a consistent exercise regimen and an over all push towards maintaining a vigorous and active lifestyle.  Counseled on age appropriate vaccines and discussed upcoming health care needs based on age/gender.  Spent time with patient counseling on need for a good patient/doctor relationship moving forward.  Discussed use of common OTC " medications and supplements.  Discussed common dietary aids and use of caffeine and the need for good sleep hygiene and stress management.    Problem List Items Addressed This Visit    None  Visit Diagnoses       Annual physical exam    -  Primary    Relevant Orders    CBC Auto Differential    Comprehensive Metabolic Panel    Urinalysis, Reflex to Urine Culture Urine, Clean Catch    Hemoglobin A1C    Lipid Panel    TSH    URIC ACID

## 2024-06-04 ENCOUNTER — PATIENT MESSAGE (OUTPATIENT)
Dept: UROLOGY | Facility: CLINIC | Age: 34
End: 2024-06-04
Payer: COMMERCIAL

## 2024-06-07 ENCOUNTER — PROCEDURE VISIT (OUTPATIENT)
Dept: UROLOGY | Facility: CLINIC | Age: 34
End: 2024-06-07
Payer: COMMERCIAL

## 2024-06-07 VITALS
BODY MASS INDEX: 24.42 KG/M2 | HEIGHT: 67 IN | DIASTOLIC BLOOD PRESSURE: 88 MMHG | WEIGHT: 155.56 LBS | HEART RATE: 88 BPM | SYSTOLIC BLOOD PRESSURE: 127 MMHG

## 2024-06-07 DIAGNOSIS — Z30.09 SCREENING AND EVALUATION FOR VASECTOMY: ICD-10-CM

## 2024-06-07 PROCEDURE — 55250 REMOVAL OF SPERM DUCT(S): CPT | Mod: S$GLB,,, | Performed by: UROLOGY

## 2024-06-07 NOTE — PROCEDURES
"Vasectomy    Date/Time: 6/7/2024 9:00 AM    Performed by: Walter Morel MD  Authorized by: Maria Polanco NP    Time out: Immediately prior to procedure a "time out" was called to verify the correct patient, procedure, equipment, support staff and site/side marked as required.    Indications:  Cary male  Position:  Dorsal lithotomy  Anesthesia:  Other (5cc 1% lidocaine)  Patient sedated: No    Preparation: Patient was prepped and draped in usual sterile fashion    Incisions:  1  Length vas excised:  2.5 cm  Vas:  Fulgurated and Buried  Sutures:  3-0 chromic SH (for fascial interposition)  Skin closures:  3-0 chromic SH  Same procedure performed on both sides    Patient tolerance:  Patient tolerated the procedure well with no immediate complications     Post-Procedure Care:  -- No heavy lifting for 5-7 days  -- No intercourse for 3-4 days  -- OK to shower tomorrow; no soaking in water for 7 days  -- Call for severe pain, bleeding, concern for infection    FU in 3 months for semen analysis.    "

## 2024-06-08 ENCOUNTER — PATIENT MESSAGE (OUTPATIENT)
Dept: UROLOGY | Facility: CLINIC | Age: 34
End: 2024-06-08
Payer: COMMERCIAL

## 2024-12-26 ENCOUNTER — HOSPITAL ENCOUNTER (EMERGENCY)
Facility: HOSPITAL | Age: 34
Discharge: HOME | End: 2024-12-26
Attending: EMERGENCY MEDICINE | Admitting: EMERGENCY MEDICINE
Payer: COMMERCIAL

## 2024-12-26 VITALS
TEMPERATURE: 99 F | DIASTOLIC BLOOD PRESSURE: 83 MMHG | HEIGHT: 66 IN | SYSTOLIC BLOOD PRESSURE: 129 MMHG | RESPIRATION RATE: 18 BRPM | HEART RATE: 108 BPM | BODY MASS INDEX: 24.91 KG/M2 | OXYGEN SATURATION: 98 % | WEIGHT: 155 LBS

## 2024-12-26 DIAGNOSIS — N50.819 TESTICULAR PAIN: Primary | ICD-10-CM

## 2024-12-26 DIAGNOSIS — R10.31 RIGHT GROIN PAIN: ICD-10-CM

## 2024-12-26 LAB
BILIRUB UR QL STRIP: NEGATIVE
CLARITY UR: CLEAR
COLOR UR: YELLOW
GLUCOSE UR QL STRIP: NEGATIVE
HGB UR QL STRIP: NEGATIVE
KETONES UR QL STRIP: NEGATIVE
LEUKOCYTE ESTERASE UR QL STRIP: NEGATIVE
NITRITE UR QL STRIP: NEGATIVE
PH UR STRIP: 7 [PH] (ref 5–8)
PROT UR QL STRIP: NEGATIVE
SP GR UR STRIP: 1.02 (ref 1–1.03)
URN SPEC COLLECT METH UR: NORMAL
UROBILINOGEN UR STRIP-ACNC: NEGATIVE EU/DL

## 2024-12-26 PROCEDURE — 99284 EMERGENCY DEPT VISIT MOD MDM: CPT | Mod: 25

## 2024-12-26 PROCEDURE — 81003 URINALYSIS AUTO W/O SCOPE: CPT | Performed by: PHYSICIAN ASSISTANT

## 2024-12-26 PROCEDURE — 87491 CHLMYD TRACH DNA AMP PROBE: CPT | Performed by: PHYSICIAN ASSISTANT

## 2024-12-26 NOTE — ED PROVIDER NOTES
Encounter Date: 12/26/2024       History     Chief Complaint   Patient presents with    Groin Pain     Pt c/o tenderness to right side of scrotum x2 weeks. Pt denied pain or bleeding with urination.     33 yo male with PMHx of duodenum ulcer and vasectomy (6/7) who presents to ED for complaint of tenderness to right scrotum x 2 weeks.  He reports he states he feels sore to the right groin area.  He states for tenderness occurred after he was working out/weightlifting. He reports  the pain has not improving so much so that when he is distracted with other task he will forget about the pain.  He states has taken Tylenol/ibuprofen as needed with some relief.  He states what brought him to the emergency department today was   That he was about to to go back to work and will not have any days off the next coming weeks..  He said his urologist can't see him until beginning of January and he did not want to wait that long.  He denies any fever, abdominal pain, N/V/D, discharge, Hematuria, urinary symptoms,   Flank pain,testicular swelling or other associated symptoms.        Review of patient's allergies indicates:   Allergen Reactions    Pcn [penicillins]      Past Medical History:   Diagnosis Date    Duodenum ulcer 08/2017    Headache      Past Surgical History:   Procedure Laterality Date    ORTHOPEDIC SURGERY Left 2010    labrum repair    SHOULDER SURGERY       No family history on file.  Social History     Tobacco Use    Smoking status: Never    Smokeless tobacco: Never   Substance Use Topics    Alcohol use: No    Drug use: No     Review of Systems   Constitutional:  Negative for chills and fever.   HENT:  Negative for congestion, ear pain, rhinorrhea and sore throat.    Eyes:  Negative for redness.   Respiratory:  Negative for shortness of breath and stridor.    Cardiovascular:  Negative for chest pain.   Gastrointestinal:  Positive for constipation. Negative for abdominal pain, diarrhea, nausea and vomiting.    Genitourinary:  Positive for testicular pain. Negative for dysuria, frequency, hematuria, penile discharge, penile pain, penile swelling, scrotal swelling and urgency.   Musculoskeletal:  Positive for back pain. Negative for neck pain.   Skin:  Negative for rash.   Neurological:  Negative for dizziness, speech difficulty, weakness, light-headedness and numbness.   Hematological:  Does not bruise/bleed easily.   Psychiatric/Behavioral:  Negative for confusion.        Physical Exam     Initial Vitals [12/26/24 1318]   BP Pulse Resp Temp SpO2   129/83 108 18 98.6 °F (37 °C) 98 %      MAP       --         Physical Exam    Nursing note and vitals reviewed.  Constitutional: He appears well-developed and well-nourished. No distress.   HENT:   Head: Normocephalic.   Right Ear: Hearing, tympanic membrane, external ear and ear canal normal.   Left Ear: Hearing, tympanic membrane, external ear and ear canal normal.   Nose: Nose normal. Mouth/Throat: Oropharynx is clear and moist. No oropharyngeal exudate, posterior oropharyngeal edema or posterior oropharyngeal erythema.   Eyes: Conjunctivae are normal.   Neck: Neck supple.   Cardiovascular:  Normal rate and regular rhythm.     Exam reveals no gallop and no friction rub.       No murmur heard.  Pulmonary/Chest: Breath sounds normal. No respiratory distress. He has no wheezes. He has no rhonchi. He has no rales.   Abdominal: Abdomen is soft. Bowel sounds are normal. He exhibits no distension. There is no abdominal tenderness. Hernia confirmed negative in the right inguinal area and confirmed negative in the left inguinal area. There is no rebound and no guarding.   Genitourinary:    Penis normal.   Cremasteric reflex is present. Right testis shows tenderness. Right testis shows no swelling.    Genitourinary Comments: Pauline YOUNG RN  present in room at time exam    Exam notable for:    Normally developed male genitalia with no external lesions or eruptions. No palpable hernia  b/l.  The penis is  circumcised. The base, shaft, and glans of the penis are nontender, without discharge, without palpable masses.   Right testicle:  mildly high-riding,  tenderness to anterior scrotum near base of penis., no scrotal fullness or edema. Cremasteric Reflex present. (-) Prehn's sign.  Left testicle: normal lie, nontender, no scrotal fullness or edema. Cremasteric Reflex present. (-) Prehn's sign.       Musculoskeletal:      Cervical back: Neck supple.     Lymphadenopathy:     He has no cervical adenopathy.   Neurological: He is alert.   Skin: Skin is warm and dry. No rash noted.   Psychiatric: He has a normal mood and affect.         ED Course   Procedures  Labs Reviewed   URINALYSIS, REFLEX TO URINE CULTURE       Result Value    Specimen UA Urine, Clean Catch      Color, UA Yellow      Appearance, UA Clear      pH, UA 7.0      Specific Gravity, UA 1.020      Protein, UA Negative      Glucose, UA Negative      Ketones, UA Negative      Bilirubin (UA) Negative      Occult Blood UA Negative      Nitrite, UA Negative      Urobilinogen, UA Negative      Leukocytes, UA Negative      Narrative:     Specimen Source->Urine   C. TRACHOMATIS/N. GONORRHOEAE BY AMP DNA          Imaging Results              US Scrotum And Testicles (Final result)  Result time 12/26/24 15:23:28      Final result by Winston Wilburn MD (12/26/24 15:23:28)                   Impression:      No significant abnormality.      Electronically signed by: Winston Wilburn MD  Date:    12/26/2024  Time:    15:23               Narrative:    EXAMINATION:  US SCROTUM AND TESTICLES    CLINICAL HISTORY:  Testicular pain, unspecified    TECHNIQUE:  Sonography of the scrotum and testes.    COMPARISON:  None.    FINDINGS:  Right Testicle:    *Size: 3 x 2.6 x 3 cm  *Appearance: Normal.  *Flow: Normal arterial and venous flow  *Epididymis: Normal.  *Hydrocele: No significant.  *Varicocele: None.  .    Left Testicle:    *Size: 4.3 x 2.7 x 2.8  cm  *Appearance: Normal.  *Flow: Normal arterial and venous flow  *Epididymis: Normal.  *Hydrocele: No significant.  *Varicocele: None.  .    Other findings: None.                                       Medications - No data to display  Medical Decision Making  34-year-old male presents secondary to right scrotal tenderness x2 weeks.  Differential diagnosis includes but isn't limited to:STI, urethritis, testicular/appendix torsion, epididymitis, orchitis, UTI, kidney stone, urinary retention, appendicitis, prostatitis, testicular mass/neoplasm, incarcerated/strangulated hernia.      On exam of the right testicle was mildly high-riding, but had a cremaster reflex with no signs of swelling.  There was moderate tenderness to anterior testicle.  Patient does report that his right normally more high-riding   Than his left.Patients urine showed no signs of infection and he had a negative testicular ultrasound.  Due to timeframe as well as patient's appearance, as well as improving symptoms,concern of testicular torsion low on differential.  Patient has very low concern for STDs this time he also denies any discharge.  He would like to forego any prophylactic treatment and would wait on test results. Query likely musculoskeletal strain or possible small hernia causing pain possibly due to history of patient's recent weightlifting.  Instructed him to follow up with his urologist to further be evaluated.  Offered muscle relaxer and patient denied.  Instructed him to take Tylenol/ibuprofen as needed pain.  We discussed strict return precautions such as fever, abdominal pain, N/V/D, worsening testicular pain or swelling.  He is understanding agreeable with treatment plan.    Amount and/or Complexity of Data Reviewed  Labs:  Decision-making details documented in ED Course.  Radiology:  Decision-making details documented in ED Course.               ED Course as of 12/26/24 1804   Thu Dec 26, 2024   1433 Urinalysis, Reflex to  Urine Culture Urine, Clean Catch    No signs of infection [MB]   1527 US Scrotum And Testicles   Negative study, no signs of torsion. [MB]      ED Course User Index  [MB] Marleni Bella PA-C                             Clinical Impression:  Final diagnoses:  [N50.819] Testicular pain (Primary)  [R10.31] Right groin pain          ED Disposition Condition    Discharge Stable          ED Prescriptions    None       Follow-up Information       Follow up With Specialties Details Why Contact Info    Vince Rodriguez MD Family Medicine   [address] [facility]  [place] [address]  [phone]      [facility] Emergency Medicine Go to  If symptoms worsen, As needed, shortness of breath, chest pain, fever, worsening cough, nausea, vomiting, abdominal pain [address] [facility] [place] [address] [phone]    [facility] Urology Schedule an appointment as soon as possible for a visit in 1 week for follow up [address] [facility] [place] [address] [phone]             Marleni Bella PA-C  12/26/24 154       Marleni Bella PA-C  12/26/24 [id number]

## 2024-12-26 NOTE — DISCHARGE INSTRUCTIONS

## 2024-12-28 LAB
C TRACH DNA SPEC QL NAA+PROBE: NOT DETECTED
N GONORRHOEA DNA SPEC QL NAA+PROBE: NOT DETECTED

## 2025-08-06 ENCOUNTER — PATIENT MESSAGE (OUTPATIENT)
Dept: FAMILY MEDICINE | Facility: CLINIC | Age: 35
End: 2025-08-06
Payer: COMMERCIAL